# Patient Record
Sex: FEMALE | Race: BLACK OR AFRICAN AMERICAN | NOT HISPANIC OR LATINO | Employment: FULL TIME | URBAN - METROPOLITAN AREA
[De-identification: names, ages, dates, MRNs, and addresses within clinical notes are randomized per-mention and may not be internally consistent; named-entity substitution may affect disease eponyms.]

---

## 2018-03-28 ENCOUNTER — HISTORICAL (OUTPATIENT)
Dept: ADMINISTRATIVE | Facility: HOSPITAL | Age: 63
End: 2018-03-28

## 2018-03-28 LAB
ABS NEUT (OLG): ABNORMAL
ALBUMIN SERPL-MCNC: 4 GM/DL (ref 3.4–5)
ALBUMIN/GLOB SERPL: 1.43 {RATIO} (ref 1.5–2.5)
ALP SERPL-CCNC: 62 UNIT/L (ref 38–126)
ALT SERPL-CCNC: 21 UNIT/L (ref 7–52)
AST SERPL-CCNC: 16 UNIT/L (ref 15–37)
BILIRUB SERPL-MCNC: 0.4 MG/DL (ref 0.2–1)
BILIRUBIN DIRECT+TOT PNL SERPL-MCNC: 0.1 MG/DL (ref 0–0.5)
BUN SERPL-MCNC: 12 MG/DL (ref 7–18)
CALCIUM SERPL-MCNC: 8.7 MG/DL (ref 8.5–10)
CHLORIDE SERPL-SCNC: 104 MMOL/L (ref 98–107)
CHOLEST SERPL-MCNC: 176 MG/DL (ref 0–200)
CHOLEST/HDLC SERPL: 4 {RATIO}
CO2 SERPL-SCNC: 29 MMOL/L (ref 21–32)
CREAT SERPL-MCNC: 0.63 MG/DL (ref 0.6–1.3)
CREAT UR-MCNC: 200 MG/DL
CREAT/UREA NIT SERPL: 19
ERYTHROCYTE [DISTWIDTH] IN BLOOD BY AUTOMATED COUNT: 12 % (ref 11.5–17)
EST. AVERAGE GLUCOSE BLD GHB EST-MCNC: 117 MG/DL
GGT SERPL-CCNC: 17 UNIT/L (ref 5–85)
GLOBULIN SER-MCNC: 2.8 GM/DL (ref 1.2–3)
GLUCOSE SERPL-MCNC: 123 MG/DL (ref 74–106)
HBA1C MFR BLD: 5.7 % (ref 4.4–6.4)
HCT VFR BLD AUTO: 41.1 % (ref 37–47)
HDLC SERPL-MCNC: 44 MG/DL (ref 35–60)
HGB BLD-MCNC: 12.9 GM/DL (ref 12–16)
LDH SERPL-CCNC: 172 UNIT/L (ref 140–271)
LDLC SERPL CALC-MCNC: 116 MG/DL (ref 0–129)
LYMPHOCYTES # BLD AUTO: NORMAL X10(3)/MCL (ref 0.6–3.4)
LYMPHOCYTES NFR BLD AUTO: NORMAL % (ref 13–40)
MCH RBC QN AUTO: 28.4 PG (ref 27–31.2)
MCHC RBC AUTO-ENTMCNC: 31 GM/DL (ref 32–36)
MCV RBC AUTO: 90 FL (ref 80–94)
MICROALBUMIN UR-MCNC: 30 MG/L
MICROALBUMIN/CREAT RATIO PNL UR: <30 MG/GM
MONOCYTES # BLD AUTO: NORMAL X10(3)/MCL (ref 0–1.8)
MONOCYTES NFR BLD AUTO: NORMAL % (ref 0.1–24)
NEUTROPHILS NFR BLD AUTO: NORMAL % (ref 47–80)
PLATELET # BLD AUTO: 235 X10(3)/MCL (ref 130–400)
PMV BLD AUTO: 8.6 FL
POTASSIUM SERPL-SCNC: 4.2 MMOL/L (ref 3.5–5.1)
PROT SERPL-MCNC: 6.8 GM/DL (ref 6.4–8.2)
RBC # BLD AUTO: 4.55 X10(6)/MCL (ref 4.2–5.4)
SODIUM SERPL-SCNC: 138 MMOL/L (ref 136–145)
TRIGL SERPL-MCNC: 103 MG/DL (ref 30–150)
TSH SERPL-ACNC: 1.15 MIU/ML (ref 0.35–4.94)
VLDLC SERPL CALC-MCNC: 20.6 MG/DL
WBC # SPEC AUTO: 6.9 X10(3)/MCL (ref 4.5–11.5)

## 2018-06-27 ENCOUNTER — HISTORICAL (OUTPATIENT)
Dept: ADMINISTRATIVE | Facility: HOSPITAL | Age: 63
End: 2018-06-27

## 2018-06-27 ENCOUNTER — HISTORICAL (OUTPATIENT)
Dept: LAB | Facility: HOSPITAL | Age: 63
End: 2018-06-27

## 2018-06-27 LAB
ABS NEUT (OLG): 4
APTT PPP: 36.4 SECOND(S) (ref 24.8–36.9)
ERYTHROCYTE [DISTWIDTH] IN BLOOD BY AUTOMATED COUNT: 12.4 % (ref 11.5–17)
HCT VFR BLD AUTO: 39.4 % (ref 37–47)
HGB BLD-MCNC: 12.2 GM/DL (ref 12–16)
INR PPP: 1.02 (ref 0–1.27)
LYMPHOCYTES # BLD AUTO: 2 X10(3)/MCL (ref 0.6–3.4)
LYMPHOCYTES NFR BLD AUTO: 29.8 % (ref 13–40)
MCH RBC QN AUTO: 27.5 PG (ref 27–31.2)
MCHC RBC AUTO-ENTMCNC: 31 GM/DL (ref 32–36)
MCV RBC AUTO: 89 FL (ref 80–94)
MONOCYTES # BLD AUTO: 0.6 X10(3)/MCL (ref 0–1.8)
MONOCYTES NFR BLD AUTO: 9.6 % (ref 0.1–24)
NEUTROPHILS NFR BLD AUTO: 60.6 % (ref 47–80)
PLATELET # BLD AUTO: 239 X10(3)/MCL (ref 130–400)
PMV BLD AUTO: 8.9 FL
PROTHROMBIN TIME: 13.7 SECOND(S) (ref 12.2–14.7)
RBC # BLD AUTO: 4.43 X10(6)/MCL (ref 4.2–5.4)
WBC # SPEC AUTO: 6.6 X10(3)/MCL (ref 4.5–11.5)

## 2018-07-31 ENCOUNTER — HISTORICAL (OUTPATIENT)
Dept: ADMINISTRATIVE | Facility: HOSPITAL | Age: 63
End: 2018-07-31

## 2018-07-31 LAB
ALBUMIN SERPL-MCNC: 4.1 GM/DL (ref 3.4–5)
ALBUMIN/GLOB SERPL: 1.58 {RATIO} (ref 1.5–2.5)
ALP SERPL-CCNC: 59 UNIT/L (ref 38–126)
ALT SERPL-CCNC: 24 UNIT/L (ref 7–52)
AST SERPL-CCNC: 27 UNIT/L (ref 15–37)
BILIRUB SERPL-MCNC: 0.3 MG/DL (ref 0.2–1)
BILIRUBIN DIRECT+TOT PNL SERPL-MCNC: 0.1 MG/DL (ref 0–0.5)
BILIRUBIN DIRECT+TOT PNL SERPL-MCNC: 0.2 MG/DL
BUN SERPL-MCNC: 14 MG/DL (ref 7–18)
CALCIUM SERPL-MCNC: 8.8 MG/DL (ref 8.5–10)
CHLORIDE SERPL-SCNC: 106 MMOL/L (ref 98–107)
CHOLEST SERPL-MCNC: 121 MG/DL (ref 0–200)
CHOLEST/HDLC SERPL: 3 {RATIO}
CO2 SERPL-SCNC: 29 MMOL/L (ref 21–32)
CREAT SERPL-MCNC: 0.65 MG/DL (ref 0.6–1.3)
EST. AVERAGE GLUCOSE BLD GHB EST-MCNC: 111 MG/DL
GLOBULIN SER-MCNC: 2.6 GM/DL (ref 1.2–3)
GLUCOSE SERPL-MCNC: 95 MG/DL (ref 74–106)
HBA1C MFR BLD: 5.5 % (ref 4.4–6.4)
HDLC SERPL-MCNC: 40 MG/DL (ref 35–60)
LDLC SERPL CALC-MCNC: 66 MG/DL (ref 0–129)
POTASSIUM SERPL-SCNC: 3.8 MMOL/L (ref 3.5–5.1)
PROT SERPL-MCNC: 6.7 GM/DL (ref 6.4–8.2)
SODIUM SERPL-SCNC: 142 MMOL/L (ref 136–145)
TRIGL SERPL-MCNC: 89 MG/DL (ref 30–150)
VLDLC SERPL CALC-MCNC: 17.8 MG/DL

## 2019-01-21 ENCOUNTER — HISTORICAL (OUTPATIENT)
Dept: ADMINISTRATIVE | Facility: HOSPITAL | Age: 64
End: 2019-01-21

## 2019-01-21 LAB
ALBUMIN SERPL-MCNC: 4.1 GM/DL (ref 3.4–5)
ALBUMIN/GLOB SERPL: 1.41 {RATIO} (ref 1.5–2.5)
ALP SERPL-CCNC: 57 UNIT/L (ref 38–126)
ALT SERPL-CCNC: 20 UNIT/L (ref 7–52)
AST SERPL-CCNC: 17 UNIT/L (ref 15–37)
BILIRUB SERPL-MCNC: 0.4 MG/DL (ref 0.2–1)
BILIRUBIN DIRECT+TOT PNL SERPL-MCNC: 0.1 MG/DL (ref 0–0.5)
BILIRUBIN DIRECT+TOT PNL SERPL-MCNC: 0.3 MG/DL
BUN SERPL-MCNC: 14 MG/DL (ref 7–18)
CALCIUM SERPL-MCNC: 8.7 MG/DL (ref 8.5–10)
CHLORIDE SERPL-SCNC: 104 MMOL/L (ref 98–107)
CO2 SERPL-SCNC: 31 MMOL/L (ref 21–32)
CREAT SERPL-MCNC: 0.63 MG/DL (ref 0.6–1.3)
EST. AVERAGE GLUCOSE BLD GHB EST-MCNC: 108 MG/DL
GLOBULIN SER-MCNC: 2.9 GM/DL (ref 1.2–3)
GLUCOSE SERPL-MCNC: 101 MG/DL (ref 74–106)
HBA1C MFR BLD: 5.4 % (ref 4.4–6.4)
POTASSIUM SERPL-SCNC: 3.9 MMOL/L (ref 3.5–5.1)
PROT SERPL-MCNC: 7 GM/DL (ref 6.4–8.2)
SODIUM SERPL-SCNC: 140 MMOL/L (ref 136–145)

## 2019-04-15 ENCOUNTER — HISTORICAL (OUTPATIENT)
Dept: ADMINISTRATIVE | Facility: HOSPITAL | Age: 64
End: 2019-04-15

## 2019-04-15 LAB
ABS NEUT (OLG): 4.1 X10(3)/MCL (ref 2.1–9.2)
ALBUMIN SERPL-MCNC: 4.2 GM/DL (ref 3.4–5)
ALBUMIN/GLOB SERPL: 1.56 {RATIO} (ref 1.5–2.5)
ALP SERPL-CCNC: 54 UNIT/L (ref 38–126)
ALT SERPL-CCNC: 21 UNIT/L (ref 7–52)
AST SERPL-CCNC: 19 UNIT/L (ref 15–37)
BILIRUB SERPL-MCNC: 0.3 MG/DL (ref 0.2–1)
BILIRUBIN DIRECT+TOT PNL SERPL-MCNC: 0.1 MG/DL (ref 0–0.5)
BILIRUBIN DIRECT+TOT PNL SERPL-MCNC: 0.2 MG/DL
BUN SERPL-MCNC: 11 MG/DL (ref 7–18)
CALCIUM SERPL-MCNC: 8.8 MG/DL (ref 8.5–10)
CHLORIDE SERPL-SCNC: 104 MMOL/L (ref 98–107)
CHOLEST SERPL-MCNC: 142 MG/DL (ref 0–200)
CHOLEST/HDLC SERPL: 3.2 {RATIO}
CO2 SERPL-SCNC: 32 MMOL/L (ref 21–32)
CREAT SERPL-MCNC: 0.61 MG/DL (ref 0.6–1.3)
CREAT UR-MCNC: 300 MG/DL
ERYTHROCYTE [DISTWIDTH] IN BLOOD BY AUTOMATED COUNT: 12.1 % (ref 11.5–17)
EST. AVERAGE GLUCOSE BLD GHB EST-MCNC: 117 MG/DL
GLOBULIN SER-MCNC: 2.8 GM/DL (ref 1.2–3)
GLUCOSE SERPL-MCNC: 132 MG/DL (ref 74–106)
HBA1C MFR BLD: 5.7 % (ref 4.4–6.4)
HCT VFR BLD AUTO: 37.4 % (ref 37–47)
HDLC SERPL-MCNC: 45 MG/DL (ref 35–60)
HGB BLD-MCNC: 12.1 GM/DL (ref 12–16)
LDLC SERPL CALC-MCNC: 74 MG/DL (ref 0–129)
LYMPHOCYTES # BLD AUTO: 1.8 X10(3)/MCL (ref 0.6–3.4)
LYMPHOCYTES NFR BLD AUTO: 27.8 % (ref 13–40)
MCH RBC QN AUTO: 28.9 PG (ref 27–31.2)
MCHC RBC AUTO-ENTMCNC: 32 GM/DL (ref 32–36)
MCV RBC AUTO: 90 FL (ref 80–94)
MICROALBUMIN UR-MCNC: 80 MG/L
MICROALBUMIN/CREAT RATIO PNL UR: <30 MG/GM
MONOCYTES # BLD AUTO: 0.5 X10(3)/MCL (ref 0.1–1.3)
MONOCYTES NFR BLD AUTO: 8.3 % (ref 0.1–24)
NEUTROPHILS NFR BLD AUTO: 63.9 % (ref 47–80)
PLATELET # BLD AUTO: 187 X10(3)/MCL (ref 130–400)
PMV BLD AUTO: 9.4 FL (ref 9.4–12.4)
POTASSIUM SERPL-SCNC: 4.1 MMOL/L (ref 3.5–5.1)
PROT SERPL-MCNC: 6.9 GM/DL (ref 6.4–8.2)
RBC # BLD AUTO: 4.18 X10(6)/MCL (ref 4.2–5.4)
SODIUM SERPL-SCNC: 142 MMOL/L (ref 136–145)
TRIGL SERPL-MCNC: 85 MG/DL (ref 30–150)
TSH SERPL-ACNC: 1.21 MIU/ML (ref 0.35–4.94)
VLDLC SERPL CALC-MCNC: 17 MG/DL
WBC # SPEC AUTO: 6.4 X10(3)/MCL (ref 4.5–11.5)

## 2019-07-16 ENCOUNTER — HISTORICAL (OUTPATIENT)
Dept: ADMINISTRATIVE | Facility: HOSPITAL | Age: 64
End: 2019-07-16

## 2019-10-14 ENCOUNTER — HISTORICAL (OUTPATIENT)
Dept: ADMINISTRATIVE | Facility: HOSPITAL | Age: 64
End: 2019-10-14

## 2019-10-14 LAB
ALBUMIN SERPL-MCNC: 4.1 GM/DL (ref 3.4–5)
ALBUMIN/GLOB SERPL: 1.58 {RATIO} (ref 1.5–2.5)
ALP SERPL-CCNC: 56 UNIT/L (ref 38–126)
ALT SERPL-CCNC: 23 UNIT/L (ref 7–52)
AST SERPL-CCNC: 20 UNIT/L (ref 15–37)
BILIRUB SERPL-MCNC: 0.3 MG/DL (ref 0.2–1)
BILIRUBIN DIRECT+TOT PNL SERPL-MCNC: 0.1 MG/DL (ref 0–0.5)
BILIRUBIN DIRECT+TOT PNL SERPL-MCNC: 0.2 MG/DL
BUN SERPL-MCNC: 13 MG/DL (ref 7–18)
CALCIUM SERPL-MCNC: 9.1 MG/DL (ref 8.5–10)
CHLORIDE SERPL-SCNC: 105 MMOL/L (ref 98–107)
CHOLEST SERPL-MCNC: 159 MG/DL (ref 0–200)
CHOLEST/HDLC SERPL: 3.6 {RATIO}
CO2 SERPL-SCNC: 30 MMOL/L (ref 21–32)
CREAT SERPL-MCNC: 0.64 MG/DL (ref 0.6–1.3)
EST. AVERAGE GLUCOSE BLD GHB EST-MCNC: 108 MG/DL
GLOBULIN SER-MCNC: 2.6 GM/DL (ref 1.2–3)
GLUCOSE SERPL-MCNC: 108 MG/DL (ref 74–106)
HBA1C MFR BLD: 5.4 % (ref 4.4–6.4)
HDLC SERPL-MCNC: 44 MG/DL (ref 35–60)
LDLC SERPL CALC-MCNC: 94 MG/DL (ref 0–129)
POTASSIUM SERPL-SCNC: 4.3 MMOL/L (ref 3.5–5.1)
PROT SERPL-MCNC: 6.7 GM/DL (ref 6.4–8.2)
SODIUM SERPL-SCNC: 141 MMOL/L (ref 136–145)
TRIGL SERPL-MCNC: 155 MG/DL (ref 30–150)
VLDLC SERPL CALC-MCNC: 31 MG/DL

## 2020-07-15 ENCOUNTER — HISTORICAL (OUTPATIENT)
Dept: ADMINISTRATIVE | Facility: HOSPITAL | Age: 65
End: 2020-07-15

## 2020-07-15 LAB
ABS NEUT (OLG): 3.8 X10(3)/MCL (ref 2.1–9.2)
ALBUMIN SERPL-MCNC: 4.2 GM/DL (ref 3.4–5)
ALBUMIN/GLOB SERPL: 1.68 {RATIO} (ref 1.5–2.5)
ALP SERPL-CCNC: 54 UNIT/L (ref 38–126)
ALT SERPL-CCNC: 25 UNIT/L (ref 7–52)
AST SERPL-CCNC: 26 UNIT/L (ref 15–37)
BILIRUB SERPL-MCNC: 0.4 MG/DL (ref 0.2–1)
BILIRUBIN DIRECT+TOT PNL SERPL-MCNC: 0.1 MG/DL (ref 0–0.5)
BILIRUBIN DIRECT+TOT PNL SERPL-MCNC: 0.3 MG/DL
BUN SERPL-MCNC: 13 MG/DL (ref 7–18)
CALCIUM SERPL-MCNC: 9.2 MG/DL (ref 8.5–10)
CHLORIDE SERPL-SCNC: 105 MMOL/L (ref 98–107)
CHOLEST SERPL-MCNC: 140 MG/DL (ref 0–200)
CHOLEST/HDLC SERPL: 2.9 {RATIO}
CO2 SERPL-SCNC: 31 MMOL/L (ref 21–32)
CREAT SERPL-MCNC: 0.6 MG/DL (ref 0.6–1.3)
CREAT UR-MCNC: 200 MG/DL
ERYTHROCYTE [DISTWIDTH] IN BLOOD BY AUTOMATED COUNT: 12.5 % (ref 11.5–17)
EST. AVERAGE GLUCOSE BLD GHB EST-MCNC: 111 MG/DL
GLOBULIN SER-MCNC: 2.5 GM/DL (ref 1.2–3)
GLUCOSE SERPL-MCNC: 100 MG/DL (ref 74–106)
HBA1C MFR BLD: 5.5 % (ref 4.4–6.4)
HCT VFR BLD AUTO: 36.3 % (ref 37–47)
HDLC SERPL-MCNC: 48 MG/DL (ref 35–60)
HGB BLD-MCNC: 11.3 GM/DL (ref 12–16)
LDLC SERPL CALC-MCNC: 72 MG/DL (ref 0–129)
LYMPHOCYTES # BLD AUTO: 2.3 X10(3)/MCL (ref 0.6–3.4)
LYMPHOCYTES NFR BLD AUTO: 34.1 % (ref 13–40)
MCH RBC QN AUTO: 27.8 PG (ref 27–31.2)
MCHC RBC AUTO-ENTMCNC: 31 GM/DL (ref 32–36)
MCV RBC AUTO: 89 FL (ref 80–94)
MICROALBUMIN UR-MCNC: 30 MG/L
MICROALBUMIN/CREAT RATIO PNL UR: <30 MG/GM
MONOCYTES # BLD AUTO: 0.5 X10(3)/MCL (ref 0.1–1.3)
MONOCYTES NFR BLD AUTO: 7.4 % (ref 0.1–24)
NEUTROPHILS NFR BLD AUTO: 58.5 % (ref 47–80)
PLATELET # BLD AUTO: 206 X10(3)/MCL (ref 130–400)
PMV BLD AUTO: 9.2 FL (ref 9.4–12.4)
POTASSIUM SERPL-SCNC: 3.9 MMOL/L (ref 3.5–5.1)
PROT SERPL-MCNC: 6.7 GM/DL (ref 6.4–8.2)
RBC # BLD AUTO: 4.06 X10(6)/MCL (ref 4.2–5.4)
SODIUM SERPL-SCNC: 142 MMOL/L (ref 136–145)
TRIGL SERPL-MCNC: 77 MG/DL (ref 30–150)
TSH SERPL-ACNC: 1.03 MIU/ML (ref 0.35–4.94)
VLDLC SERPL CALC-MCNC: 15.4 MG/DL
WBC # SPEC AUTO: 6.6 X10(3)/MCL (ref 4.5–11.5)

## 2020-08-13 LAB — NONINV COLON CA DNA+OCC BLD SCRN STL QL: NEGATIVE

## 2020-09-02 LAB — BCS RECOMMENDATION EXT: NORMAL

## 2021-01-13 ENCOUNTER — HISTORICAL (OUTPATIENT)
Dept: ADMINISTRATIVE | Facility: HOSPITAL | Age: 66
End: 2021-01-13

## 2021-01-13 LAB
ALBUMIN SERPL-MCNC: 4.1 GM/DL (ref 3.4–5)
ALBUMIN/GLOB SERPL: 1.58 {RATIO} (ref 1.5–2.5)
ALP SERPL-CCNC: 72 UNIT/L (ref 38–126)
ALT SERPL-CCNC: 37 UNIT/L (ref 7–52)
AST SERPL-CCNC: 28 UNIT/L (ref 15–37)
BILIRUB SERPL-MCNC: 0.3 MG/DL (ref 0.2–1)
BILIRUBIN DIRECT+TOT PNL SERPL-MCNC: 0.1 MG/DL (ref 0–0.5)
BILIRUBIN DIRECT+TOT PNL SERPL-MCNC: 0.2 MG/DL
BUN SERPL-MCNC: 14 MG/DL (ref 7–18)
CALCIUM SERPL-MCNC: 9.2 MG/DL (ref 8.5–10)
CHLORIDE SERPL-SCNC: 101 MMOL/L (ref 98–107)
CO2 SERPL-SCNC: 30 MMOL/L (ref 21–32)
CREAT SERPL-MCNC: 0.67 MG/DL (ref 0.6–1.3)
EST. AVERAGE GLUCOSE BLD GHB EST-MCNC: 194 MG/DL
GLOBULIN SER-MCNC: 2.6 GM/DL (ref 1.2–3)
GLUCOSE SERPL-MCNC: 290 MG/DL (ref 74–106)
HBA1C MFR BLD: 8.4 % (ref 4.4–6.4)
POTASSIUM SERPL-SCNC: 4.4 MMOL/L (ref 3.5–5.1)
PROT SERPL-MCNC: 6.7 GM/DL (ref 6.4–8.2)
SODIUM SERPL-SCNC: 139 MMOL/L (ref 136–145)

## 2021-05-27 ENCOUNTER — HISTORICAL (OUTPATIENT)
Dept: ADMINISTRATIVE | Facility: HOSPITAL | Age: 66
End: 2021-05-27

## 2021-05-27 LAB
ALBUMIN SERPL-MCNC: 4.2 GM/DL (ref 3.4–5)
ALBUMIN/GLOB SERPL: 1.62 {RATIO} (ref 1.5–2.5)
ALP SERPL-CCNC: 54 UNIT/L (ref 38–126)
ALT SERPL-CCNC: 32 UNIT/L (ref 7–52)
AST SERPL-CCNC: 28 UNIT/L (ref 15–37)
BILIRUB SERPL-MCNC: 0.4 MG/DL (ref 0.2–1)
BILIRUBIN DIRECT+TOT PNL SERPL-MCNC: 0.1 MG/DL (ref 0–0.5)
BILIRUBIN DIRECT+TOT PNL SERPL-MCNC: 0.3 MG/DL
BUN SERPL-MCNC: 15 MG/DL (ref 7–18)
CALCIUM SERPL-MCNC: 9.2 MG/DL (ref 8.5–10)
CHLORIDE SERPL-SCNC: 103 MMOL/L (ref 98–107)
CHOLEST SERPL-MCNC: 145 MG/DL (ref 0–200)
CHOLEST/HDLC SERPL: 2.7 {RATIO}
CO2 SERPL-SCNC: 30 MMOL/L (ref 21–32)
CREAT SERPL-MCNC: 0.63 MG/DL (ref 0.6–1.3)
EST. AVERAGE GLUCOSE BLD GHB EST-MCNC: 134 MG/DL
GLOBULIN SER-MCNC: 2.6 GM/DL (ref 1.2–3)
GLUCOSE SERPL-MCNC: 152 MG/DL (ref 74–106)
HBA1C MFR BLD: 6.3 % (ref 4.4–6.4)
HDLC SERPL-MCNC: 53 MG/DL (ref 35–60)
LDLC SERPL CALC-MCNC: 83 MG/DL (ref 0–129)
POTASSIUM SERPL-SCNC: 4.2 MMOL/L (ref 3.5–5.1)
PROT SERPL-MCNC: 6.8 GM/DL (ref 6.4–8.2)
SODIUM SERPL-SCNC: 140 MMOL/L (ref 136–145)
TRIGL SERPL-MCNC: 80 MG/DL (ref 30–150)
VLDLC SERPL CALC-MCNC: 16 MG/DL

## 2021-08-10 ENCOUNTER — HISTORICAL (OUTPATIENT)
Dept: ADMINISTRATIVE | Facility: HOSPITAL | Age: 66
End: 2021-08-10

## 2021-08-10 LAB
ABS NEUT (OLG): 5 X10(3)/MCL (ref 2.1–9.2)
ALBUMIN SERPL-MCNC: 4.1 GM/DL (ref 3.4–5)
ALBUMIN/GLOB SERPL: 1.52 {RATIO} (ref 1.5–2.5)
ALP SERPL-CCNC: 63 UNIT/L (ref 38–126)
ALT SERPL-CCNC: 33 UNIT/L (ref 7–52)
AST SERPL-CCNC: 32 UNIT/L (ref 15–37)
BILIRUB SERPL-MCNC: 0.4 MG/DL (ref 0.2–1)
BILIRUBIN DIRECT+TOT PNL SERPL-MCNC: 0.1 MG/DL (ref 0–0.5)
BILIRUBIN DIRECT+TOT PNL SERPL-MCNC: 0.3 MG/DL
BUN SERPL-MCNC: 11 MG/DL (ref 7–18)
CALCIUM SERPL-MCNC: 9.5 MG/DL (ref 8.5–10)
CHLORIDE SERPL-SCNC: 102 MMOL/L (ref 98–107)
CHOLEST SERPL-MCNC: 139 MG/DL (ref 0–200)
CHOLEST/HDLC SERPL: 3.1 {RATIO}
CO2 SERPL-SCNC: 29 MMOL/L (ref 21–32)
CREAT SERPL-MCNC: 0.68 MG/DL (ref 0.6–1.3)
CREAT UR-MCNC: 100 MG/DL
ERYTHROCYTE [DISTWIDTH] IN BLOOD BY AUTOMATED COUNT: 12.8 % (ref 11.5–17)
EST. AVERAGE GLUCOSE BLD GHB EST-MCNC: 163 MG/DL
GLOBULIN SER-MCNC: 2.7 GM/DL (ref 1.2–3)
GLUCOSE SERPL-MCNC: 200 MG/DL (ref 74–106)
HBA1C MFR BLD: 7.3 % (ref 4.4–6.4)
HCT VFR BLD AUTO: 37.9 % (ref 37–47)
HDLC SERPL-MCNC: 45 MG/DL (ref 35–60)
HGB BLD-MCNC: 11.8 GM/DL (ref 12–16)
LDLC SERPL CALC-MCNC: 71 MG/DL (ref 0–129)
LYMPHOCYTES # BLD AUTO: 2 X10(3)/MCL (ref 0.6–3.4)
LYMPHOCYTES NFR BLD AUTO: 26.9 % (ref 13–40)
MAGNESIUM SERPL-MCNC: 2 MG/DL (ref 1.6–2.6)
MCH RBC QN AUTO: 27.8 PG (ref 27–31.2)
MCHC RBC AUTO-ENTMCNC: 31 GM/DL (ref 32–36)
MCV RBC AUTO: 89 FL (ref 80–94)
MICROALBUMIN UR-MCNC: 10 MG/L
MICROALBUMIN/CREAT RATIO PNL UR: <30 MG/GM
MONOCYTES # BLD AUTO: 0.4 X10(3)/MCL (ref 0.1–1.3)
MONOCYTES NFR BLD AUTO: 5.6 % (ref 0.1–24)
NEUTROPHILS NFR BLD AUTO: 67.5 % (ref 47–80)
PLATELET # BLD AUTO: 221 X10(3)/MCL (ref 130–400)
PMV BLD AUTO: 10.3 FL (ref 9.4–12.4)
POTASSIUM SERPL-SCNC: 4.2 MMOL/L (ref 3.5–5.1)
PROT SERPL-MCNC: 6.8 GM/DL (ref 6.4–8.2)
RBC # BLD AUTO: 4.24 X10(6)/MCL (ref 4.2–5.4)
SODIUM SERPL-SCNC: 139 MMOL/L (ref 136–145)
TRIGL SERPL-MCNC: 124 MG/DL (ref 30–150)
TSH SERPL-ACNC: 0.95 MIU/ML (ref 0.35–4.94)
VLDLC SERPL CALC-MCNC: 24.8 MG/DL
WBC # SPEC AUTO: 7.4 X10(3)/MCL (ref 4.5–11.5)

## 2022-03-02 ENCOUNTER — HISTORICAL (OUTPATIENT)
Dept: ADMINISTRATIVE | Facility: HOSPITAL | Age: 67
End: 2022-03-02

## 2022-04-10 ENCOUNTER — HISTORICAL (OUTPATIENT)
Dept: ADMINISTRATIVE | Facility: HOSPITAL | Age: 67
End: 2022-04-10

## 2022-04-26 VITALS
DIASTOLIC BLOOD PRESSURE: 80 MMHG | OXYGEN SATURATION: 97 % | BODY MASS INDEX: 36.55 KG/M2 | HEIGHT: 64 IN | WEIGHT: 214.06 LBS | SYSTOLIC BLOOD PRESSURE: 136 MMHG

## 2022-05-03 NOTE — HISTORICAL [facility]
This is a historical note converted from [facility]. Formatting and pictures may have been removed.  Please reference Edmund for original formatting and attached multimedia. Chief Complaint  bruise on right leg. no injury recalled  History of Present Illness  Patient?developed spontaneous?large bruise right lateral knee?on Saturday. ?Painful to the touch.? Minimal?improvement since then. ?Denies any trauma or injury.? Denies spontaneous?bleeding?or medication changes.  Review of Systems  Constitutional:?No weight loss, no fever, no fatigue, no chills, no night sweats,?no weakness  Eyes:?No blurred vision,?no redness,?no drainage,?no ocular?pain  HEENT:?No sore throat,?no ear pain, no sinus pressure, no nasal congestion, no rhinorrhea, no postnasal drip  Respiratory:?No cough, no wheezing, no sputum production, no shortness of breath  Cardiovascular:?No chest pain, no palpitations, no dyspnea on exertion,?no orthopnea  Gastrointestinal:?No nausea, no vomiting, no abdominal pain, no diarrhea,?no constipation, no melena,?no hematochezia  Genitourinary:?No dysuria, no hematuria, no frequency, no urgency, no incontinence, no discharge  Musculoskeletal:?myalgias, no arthralgias, no weakness, no joint effusion, no edema  Integumentary:?No rashes, no hives, no itching, no lesions, no jaundice  Neurologic:?No headaches, no numbness, no tingling, no weakness, no dizziness  Psychiatric:?No anxiety, no irritability, no depression,?no suicidal ideations, no?homicidal ideations,?no delusions, no hallucinations  Endocrine:?No polyuria, no polydipsia, no polyphagia  Hematology:?bruising, no lymphadenopathy,?no paleness  ?  Physical Exam  Vitals & Measurements  HR:?70(Peripheral)? BP:?122/76?  HT:?162.56?cm? HT:?162.56?cm? WT:?93.0?kg? WT:?93.0?kg? BMI:?35.19?  General:?Well developed, Well-nourished, in No acute distress, A&O x 4  Eye:?PERRLA, EOMI, Clear conjunctiva, Eyelids unremarkable  Cardiovascular:?Regular rate and rhythm, No  murmurs, No gallops, No rubs  Respiratory:?Clear to auscultation bilaterally, No wheezes, No rhonchi, No?crackles  Abdomen:? Soft, Nontender, No hepatosplenomegaly, Normal and equal bowel sounds, No masses, No rebound, No guarding  Musculoskeletal:? No tenderness, FROM, No joint abnormality, No clubbing, No cyanosis,No?edema  Neurologic:? No motor or sensory deficits, Reflexes 2+ throughout, CN II-XII grossly intact  Integumentary:?6 cm x 6 cm?ecchymosis right?lateral knee, tenderness to palpation  ?  Assessment/Plan  1.?Spontaneous bruising  ?CBC, PT?INR, PTT  Venous ultrasound right lower extremity  Ordered:  Office/Outpatient Visit Level 3 Established [id number] PC, Spontaneous bruising, [facility], 06/27/18 11:52:00 CDT  Schedule Diagnostics Study, Venous Ultrasound, No Oral Contrast Requested, Next Available Dr. Baumann, 06/27/18 11:52:00 CDT, Spontaneous bruising  ?   Problem List/Past Medical History  Ongoing  Diabetes mellitus type 2  CORINNA - Generalised anxiety disorder  GERD - Gastro-esophageal reflux disease  Hypertension  Historical  Asthma  Procedure/Surgical History  Abdominal hysterectomy, Dilation and curettage.  Medications  aspirin 81 mg oral tablet, 81 mg= 1 tab(s), Oral, BID  Crestor 5 mg oral tablet, 5 mg= 1 tab(s), Oral, Daily, 5 refills  hydrochlorothiazide-lisinopril 12.5 mg-20 mg oral tablet, 1 tab(s), Oral, Daily, 3 refills  LEVOCETIRIZI TAB 5MG, 5 mg= 1 tab(s), Oral, Daily  MELOXICAM TAB 15MG, 15 mg= 1 tab(s), Oral, Daily  one touch ultra lancets, See Instructions, 11 refills  Prenatal Multivitamins, Oral, Daily  Trulicity Pen 1.5 mg/0.5 mL subcutaneous solution, 1.5 mg, Subcutaneous, qWeek, 3 refills  Vitamin C 100 mg oral tablet, chewable, 100 mg= 1 tab(s), Chewed, Daily  Zinc, 140 mg, Oral, Daily  Allergies  Bactrim?(unknown)  codeine?(unknown)  penicillin?(unknown)  Social History  Alcohol  Current, 1-2 times per month, 03/27/2018  Employment/School  Employed,  03/27/2018  Home/Environment  Lives with Children., 03/28/2018  Tobacco  Never smoker, 03/27/2018  Family History  Dementia: Mother.  Diabetes mellitus type 2: Mother, Sister, Sister, Sister and Daughter.  Hypertension.: Mother.  Pancreatic cancer: Father.  Immunizations  Vaccine Date Status   tetanus/diphtheria/pertussis, acel(Tdap) 01/24/2013 Recorded   Health Maintenance  Health Maintenance  ???Pending?(in the next year)  ??? ??Due?  ??? ? ? ?Alcohol Misuse Screening due??06/27/18??and every 1??year(s)  ??? ? ? ?Cervical Cancer Screening due??06/27/18??and every 5??year(s)  ??? ? ? ?Colorectal Screening due??06/27/18??Variable frequency  ??? ? ? ?Depression Screening due??06/27/18??and every 1??year(s)  ??? ? ? ?Diabetes Maintenance-Eye Exam due??06/27/18??Variable frequency  ??? ? ? ?Diabetes Maintenance-Foot Exam due??06/27/18??Variable frequency  ??? ? ? ?Diabetes Maintenance-Urine Dipstick due??06/27/18??Variable frequency  ??? ? ? ?Hypertension Maintenance-Medication Prescribed due??06/27/18??and every 1??year(s)  ??? ? ? ?Hypertension Management-Education due??06/27/18??and every 1??year(s)  ??? ? ? ?Smoking Cessation due??06/27/18??and every 1??year(s)  ??? ? ? ?Zoster Vaccine due??06/27/18??and every 100??year(s)  ??? ??Due In Future?  ??? ? ? ?Diabetes Maintenance-HgbA1c not due until??09/28/18??and every 6??month(s)  ??? ? ? ?Influenza Vaccine not due until??10/02/18??and every 1??year(s)  ??? ? ? ?Hypertension Management-Blood Pressure not due until??12/27/18??and every 6??month(s)  ??? ? ? ?Aspirin Therapy for CVD Prevention not due until??03/28/19??and every 1??year(s)  ??? ? ? ?Diabetes Maintenance-Fasting Lipid Profile not due until??03/28/19??and every 1??year(s)  ??? ? ? ?Diabetes Maintenance-Microalbumin not due until??03/28/19??and every 1??year(s)  ??? ? ? ?Diabetes Maintenance-Serum Creatinine not due until??03/28/19??and every 1??year(s)  ??? ? ? ?Hypertension Management-BMP not due  until??03/28/19??and every 1??year(s)  ??? ? ? ?Lipid Screening not due until??03/28/19??and every 1??year(s)  ??? ? ? ?Breast Cancer Screening not due until??04/10/19??and every 1??year(s)  ???Satisfied?(in the past 1 year)  ??? ??Satisfied?  ??? ? ? ?Aspirin Therapy for CVD Prevention on??03/28/18.  ??? ? ? ?Blood Pressure Screening on??06/27/18.??Satisfied by Yara Gordillo  ??? ? ? ?Body Mass Index Check on??06/27/18.??Satisfied by Yara Gordillo  ??? ? ? ?Breast Cancer Screening on??04/10/18.??Satisfied by Giovanna Li  ??? ? ? ?Diabetes Maintenance-Fasting Lipid Profile on??03/28/18.??Satisfied by Sussy Matias  ??? ? ? ?Hypertension Management-Blood Pressure on??06/27/18.??Satisfied by Yara Gordillo  ??? ? ? ?Lipid Screening on??03/28/18.??Satisfied by Sussy Matias  ??? ? ? ?Obesity Screening on??06/27/18.??Satisfied by Yara Gordillo  ??? ? ? ?Tobacco Use Screening on??06/27/18.??Satisfied by Yara Gordillo  ?  ?

## 2022-05-03 NOTE — HISTORICAL [facility]
This is a historical note converted from [place]. Formatting and pictures may have been removed.  Please reference Edmund for original formatting and attached multimedia. Chief Complaint  cpx fasting  History of Present Illness  63?year old female presents for wellness visit.  Blood Pressure - 134/90  BMI - 35.78  Immunizations -?patient declines pneumonia vaccine  Breast Cancer Screening - Last screening Mammogram 2013  Cervical Cancer Screening -?total vaginal hysterectomy in 2005 for benign indications  Colon Cancer Screening - Declines colonoscopy, patient has periodically done?fecal occult blood testing in the past.? Declines at this time  Diet - Average  Exercise - Minimal  DM II - HbA1C 5.4 in January.? Trulicity decreased to 0.75mg weekly.? Tolerating well without any side effects.? Patient reports having eye exam last year at [facility].  Increased anxiety due to family issues with the care of her mother and disagreements with her sisters.? Patient feels like she is managing as best she can.  Hypercholesterolemia -at goal with Crestor 5 mg daily.? Tolerating well without any side effects.  HTN -controlled with?lisinopril HCTZ 20/12.5 mg daily. ?Tolerating well without any side effects.  Review of Systems  Constitutional:?No weight loss, no fever, no fatigue, no chills, no night sweats,?no weakness  Eyes:?No blurred vision,?no redness,?no drainage,?no ocular?pain  HEENT:?No sore throat,?no ear pain, no sinus pressure, no nasal congestion, no rhinorrhea, no postnasal drip  Respiratory:?No cough, no wheezing, no sputum production, no shortness of breath  Cardiovascular:?No chest pain, no palpitations, no dyspnea on exertion,?no orthopnea  Gastrointestinal:?No nausea, no vomiting, no abdominal pain, no diarrhea,?no constipation, no melena,?no hematochezia  Genitourinary:?No dysuria, no hematuria, no frequency, no urgency, no incontinence, no discharge  Musculoskeletal:?No myalgias, no arthralgias, no weakness, no  joint effusion, no edema  Integumentary:?No rashes, no hives, no itching, no lesions, no jaundice  Neurologic:?No headaches, no numbness, no tingling, no weakness, no dizziness  Psychiatric:?anxiety, no irritability, no depression,?no suicidal ideations, no?homicidal ideations,?no delusions, no hallucinations  Endocrine:?No polyuria, no polydipsia, no polyphagia  Hematology:?No bruising, no lymphadenopathy,?no paleness  ?  Physical Exam  Vitals & Measurements  HR:?70(Peripheral)? BP:?134/90?  HT:?162?cm? WT:?93.9?kg? BMI:?35.78?  General:?Well developed, Well-nourished, in No acute distress, A&O x 4  Eye:?PERRLA, EOMI, Clear conjunctiva, Eyelids unremarkable  Ears:?Bilateral EAC clear?and?Bilateral TM clear  Nose:? Mucosa normal, No rhinorrhea, No lesions  O/P:??No?erythema,?No tonsillar hypertrophy,?No tonsillar exudates,?No cobblestoning  Neck:?Soft, Nontender, No thyromegaly, Full range of motion, No cervical lymphadenopathy, No JVD  Cardiovascular:?Regular rate and rhythm, No murmurs, No gallops, No rubs  Respiratory:?Clear to auscultation bilaterally, No wheezes, No rhonchi, No?crackles  Abdomen:? Soft, Nontender, No hepatosplenomegaly, Normal and equal bowel sounds, No masses, No rebound, No guarding  Musculoskeletal:? No tenderness, FROM, No joint abnormality, No clubbing, No cyanosis,No?edema  Neurologic:? No motor or sensory deficits, Reflexes 2+ throughout, CN II-XII grossly intact  Integumentary:?No rashes or skin lesions  Psychiatric:?Good insight,?appropriate thought process, normal affect,?appropriate?speech,  non-suicidal, cooperative, appropriate judgment  Assessment/Plan  1.?Wellness examination  ?Discussed the?importance of maintaining a balanced diet and regular exercise  Declined colorectal cancer screening at this time. ?Patient understands risk.  Patient declines pneumococcal vaccine  Ordered:  CBC w/ Auto Diff, Routine collect, 04/15/19 8:15:00 CDT, Blood, Order for future visit, Stop date  04/15/19 8:15:00 CDT, Lab Collect, Wellness examination, 04/15/19 8:15:00 CDT  Comprehensive Metabolic Panel, Routine collect, 04/15/19 8:15:00 CDT, Blood, Order for future visit, Stop date 04/15/19 8:15:00 CDT, Lab Collect, Wellness examination, 04/15/19 8:15:00 CDT  Lab Collection Request, 04/15/19 8:15:00 CDT, [facility], 04/15/19 8:15:00 CDT  Lipid Panel, Routine collect, 04/15/19 8:15:00 CDT, Blood, Order for future visit, Stop date 04/15/19 8:15:00 CDT, Lab Collect, Wellness examination, 04/15/19 8:15:00 CDT  Preventative Health Care Est 40-64 years [id number] PC, Wellness examination, [facility], 04/15/19 8:15:00 CDT  Thyroid Stimulating Hormone, Routine collect, 04/15/19 8:15:00 CDT, Blood, Order for future visit, Stop date 04/15/19 8:15:00 CDT, Lab Collect, Wellness examination, 04/15/19 8:15:00 CDT  ?  2.?Diabetes mellitus type 2  Ordered:  Clinic Follow up, *Est. 10/15/19 3:00:00 CDT, Order for future visit, Diabetes mellitus type 2  Hypertension  Hypercholesterolemia, [facility]  Hemoglobin A1c, Routine collect, 04/15/19 8:15:00 CDT, Blood, Order for future visit, Stop date 04/15/19 8:15:00 CDT, Lab Collect, Diabetes mellitus type 2, 04/15/19 8:15:00 CDT  Microalbumin Urine, Routine collect, Urine, Order for future visit, 04/15/19 8:15:00 CDT, Stop date 04/15/19 8:15:00 CDT, Nurse collect, Diabetes mellitus type 2  ?  3.?Hypertension  ?Continue lisinopril HCTZ 20/12.5 mg daily  Ordered:  Clinic Follow up, *Est. 10/15/19 3:00:00 CDT, Order for future visit, Diabetes mellitus type 2  Hypertension  Hypercholesterolemia, [facility]  ?  4.?Hypercholesterolemia  ?CMP and FLP today  Ordered:  Clinic Follow up, *Est. 10/15/19 3:00:00 CDT, Order for future visit, Diabetes mellitus type 2  Hypertension  Hypercholesterolemia, [facility]  ?  5.?CORINNA (generalized anxiety disorder)  ?Offered support  Discussed?treatment options at length?if needed  ?   Problem List/Past Medical History  Ongoing  Diabetes  mellitus type 2  CORINNA (generalized anxiety disorder)  GERD - Gastro-esophageal reflux disease  Hypercholesterolemia  Hypertension  Historical  Asthma  Procedure/Surgical History  Total hysterectomy (01/03/2005)  Abdominal hysterectomy  Dilation and curettage   Medications  aspirin 81 mg oral tablet, 81 mg= 1 tab(s), Oral, Daily  hydrochlorothiazide-lisinopril 12.5 mg-20 mg oral tablet, See Instructions, 3 refills  LEVOCETIRIZI TAB 5MG, 5 mg= 1 tab(s), Oral, Daily  meloxicam 15 mg oral tablet, See Instructions, 3 refills  one touch ultra lancets, See Instructions, 11 refills  ONE TOUCH ULTRA TEST STRIPS, See Instructions, 5 refills  Prenatal Multivitamins, Oral, Daily  rosuvastatin 5 mg oral tablet, 5 mg= 1 tab(s), Oral, Daily, 3 refills  Trulicity Pen 0.75 mg/0.5 mL subcutaneous solution, 0.75 mg, Subcutaneous, q7day  Vitamin C 100 mg oral tablet, chewable, 100 mg= 1 tab(s), Chewed, Daily  Zinc, 140 mg, Oral, Daily  Allergies  Bactrim?(unknown)  codeine?(unknown)  penicillin?(unknown)  Social History  Alcohol  Current, 1-2 times per month, 03/27/2018  Employment/School  Employed, 03/27/2018  Home/Environment  Lives with Children., 03/28/2018  Tobacco  Never (less than 100 in lifetime), N/A, 04/15/2019  Never smoker, 03/27/2018  Family History  Dementia: Mother.  Diabetes mellitus type 2: Mother, Sister, Sister, Sister and Daughter.  Hypertension.: Mother.  Pancreatic cancer: Father.  Daughter: History is negative  Immunizations  Vaccine Date Status   tetanus/diphtheria/pertussis, acel(Tdap) 01/24/2013 Recorded   Health Maintenance  Health Maintenance  ???Pending?(in the next year)  ??? ??Due?  ??? ? ? ?Diabetes Maintenance-Microalbumin due??03/28/19??and every 1??year(s)  ??? ? ? ?ADL Screening due??04/15/19??and every 1??year(s)  ??? ? ? ?Colorectal Screening due??04/15/19??and every?  ??? ? ? ?Depression Screening due??04/15/19??and every?  ??? ? ? ?Diabetes Maintenance-Eye Exam due??04/15/19??and every?  ??? ? ?  ?Diabetes Maintenance-Urine Dipstick due??04/15/19??Variable frequency  ??? ? ? ?Zoster Vaccine due??04/15/19??and every 100??year(s)  ??? ??Due In Future?  ??? ? ? ?Diabetes Maintenance-Fasting Lipid Profile not due until??07/31/19??and every 1??year(s)  ??? ? ? ?Diabetes Maintenance-HgbA1c not due until??01/21/20??and every 1??year(s)  ??? ? ? ?Hypertension Management-BMP not due until??01/21/20??and every 1??year(s)  ??? ? ? ?Diabetes Maintenance-Serum Creatinine not due until??01/21/20??and every 1??year(s)  ??? ? ? ?Breast Cancer Screening not due until??04/09/20??and every 2??year(s)  ??? ? ? ?Blood Pressure Screening not due until??04/14/20??and every 1??year(s)  ??? ? ? ?Body Mass Index Check not due until??04/14/20??and every 1??year(s)  ??? ? ? ?Diabetes Maintenance-Foot Exam not due until??04/14/20??and every 1??year(s)  ??? ? ? ?Hypertension Management-Blood Pressure not due until??04/14/20??and every 1??year(s)  ???Satisfied?(in the past 1 year)  ??? ??Satisfied?  ??? ? ? ?Alcohol Misuse Screening on??04/15/19.??Satisfied by Guillermo Irby Jr, MD  ??? ? ? ?Aspirin Therapy for CVD Prevention on??04/15/19.??Satisfied by Guillermo Irby Jr, MD??Reason: Expectation Satisfied Elsewhere  ??? ? ? ?Blood Pressure Screening on??04/15/19.??Satisfied by Yara Gordillo LPN  ??? ? ? ?Body Mass Index Check on??04/15/19.??Satisfied by Yara Gordillo LPN  ??? ? ? ?Diabetes Maintenance-Foot Exam on??04/15/19.??Satisfied by Guillermo Irby Jr, MD  ??? ? ? ?Diabetes Screening on??01/21/19.??Satisfied by Jud Valente  ??? ? ? ?Hypertension Maintenance-Medication Prescribed on??04/15/19.??Satisfied by Guillermo Irby Jr, MD??Reason: Expectation Satisfied Elsewhere  ??? ? ? ?Hypertension Management-Education on??04/15/19.??Satisfied by Guillermo Irby Jr, MD??Reason: Expectation Satisfied Elsewhere  ??? ? ? ?Influenza Vaccine on??01/21/19.??Satisfied by Yara Gordillo LPN  ??? ? ? ?Lipid Screening  on??07/31/18.??Satisfied by Luiz Antonio  ??? ? ? ?Obesity Screening on??04/15/19.??Satisfied by Yara Gordillo LPN  ??? ??Refused?  ??? ? ? ?Colorectal Screening on??04/15/19.??Recorded by Guillermo Irby Jr, MD??Reason: Patient Refuses  ??? ??Canceled?  ??? ? ? ?Smoking Cessation on??04/15/19.?Recorded by Guillermo Irby Jr, MD?Reason: Expectation Not Necessary  ?  ?

## 2022-05-03 NOTE — HISTORICAL [facility]
This is a historical note converted from Edmund. Formatting and pictures may have been removed.  Please reference Edmund for original formatting and attached multimedia. Chief Complaint  6 mth f/u  History of Present Illness  DM II - HbA1C 5.7 April 2018  HTN -well-controlled with lisinopril HCTZ 20/12.5 mg daily.?  Low back pain intermittent currently.? Radicular symptoms have resolved.? Manageable with meloxicam?15 mg daily.  Does report intermittent?cramps in bilateral lower extremities.? Does improve with?water and salt intake.? Patient does report?poor water intake overall.  Review of Systems  Constitutional:?No weight loss, no fever, no fatigue, no chills, no night sweats,?no weakness  Eyes:?No blurred vision,?no redness,?no drainage,?no ocular?pain  HEENT:?No sore throat,?no ear pain, no sinus pressure, no nasal congestion, no rhinorrhea, no postnasal drip  Respiratory:?No cough, no wheezing, no sputum production, no shortness of breath  Cardiovascular:?No chest pain, no palpitations, no dyspnea on exertion,?no orthopnea  Gastrointestinal:?No nausea, no vomiting, no abdominal pain, no diarrhea,?no constipation, no melena,?no hematochezia  Genitourinary:?No dysuria, no hematuria, no frequency, no urgency, no incontinence, no discharge  Musculoskeletal:?myalgias, no arthralgias, no weakness, no joint effusion, no edema  Integumentary:?No rashes, no hives, no itching, no lesions, no jaundice  Neurologic:?No headaches, no numbness, no tingling, no weakness, no dizziness  Psychiatric:?No anxiety, no irritability, no depression,?no suicidal ideations, no?homicidal ideations,?no delusions, no hallucinations  Endocrine:?No polyuria, no polydipsia, no polyphagia  Hematology:?No bruising, no lymphadenopathy,?no paleness  ?  Physical Exam  Vitals & Measurements  HR:?80(Peripheral)? BP:?136/80?  HT:?162?cm? WT:?94.3?kg? BMI:?35.93?  General:?Well developed, Well-nourished, in No acute distress, A&O x 4  Eye:?PERRLA,  EOMI, Clear conjunctiva, Eyelids unremarkable  Ears:?Bilateral EAC clear?and?Bilateral TM clear  Nose:? Mucosa normal, No rhinorrhea, No lesions  O/P:??No?erythema,?No tonsillar hypertrophy,?No tonsillar exudates,?No cobblestoning  Neck:?Soft, Nontender, No thyromegaly, Full range of motion, No cervical lymphadenopathy, No JVD  Cardiovascular:?Regular rate and rhythm, No murmurs, No gallops, No rubs  Respiratory:?Clear to auscultation bilaterally, No wheezes, No rhonchi, No?crackles  Abdomen:? Soft, Nontender, No hepatosplenomegaly, Normal and equal bowel sounds, No masses, No rebound, No guarding  Musculoskeletal:? No tenderness, FROM, No joint abnormality, No clubbing, No cyanosis,No?edema  Neurologic:? No motor or sensory deficits, Reflexes 2+ throughout, CN II-XII grossly intact  Integumentary:?No rashes or skin lesions  ?  Assessment/Plan  1.?Diabetes mellitus type 2?E11.9  Ordered:  Clinic Follow up, *Est. 04/14/20 3:00:00 CDT, Wellness Physical, Order for future visit, Diabetes mellitus type 2  Hypertension  Hypercholesterolemia, [facility]  Hemoglobin A1c, Routine collect, 10/14/19 9:39:00 CDT, Blood, Stop date 10/14/19 9:39:00 CDT, Lab Collect, Diabetes mellitus type 2, 10/14/19 9:39:00 CDT  Office/Outpatient Visit Level 4 Established [id number] PC, Diabetes mellitus type 2  Hypertension  Hypercholesterolemia, [facility] - [facility], 10/14/19 9:31:00 CDT  ?  2.?Hypertension?I10  ?Continue lisinopril HCTZ 20/12.5 mg daily  Ordered:  Clinic Follow up, *Est. 04/14/20 3:00:00 CDT, Wellness Physical, Order for future visit, Diabetes mellitus type 2  Hypertension  Hypercholesterolemia, [facility]  Office/Outpatient Visit Level 4 Established [id number] PC, Diabetes mellitus type 2  Hypertension  Hypercholesterolemia, [facility], 10/14/19 9:31:00 CDT  ?  3.?Hypercholesterolemia?E78.00  Ordered:  Clinic Follow up, *Est. 04/14/20 3:00:00 CDT, Wellness Physical, Order for future visit, Diabetes mellitus type 2   Hypertension  Hypercholesterolemia, [facility]  Comprehensive Metabolic Panel, Routine collect, 10/14/19 9:39:00 CDT, Blood, Stop date 10/14/19 9:39:00 CDT, Lab Collect, Hypercholesterolemia, 10/14/19 9:39:00 CDT  Lipid Panel, Routine collect, 10/14/19 9:39:00 CDT, Blood, Stop date 10/14/19 9:39:00 CDT, Lab Collect, Hypercholesterolemia, 10/14/19 9:39:00 CDT  Office/Outpatient Visit Level 4 Established [id number] PC, Diabetes mellitus type 2  Hypertension  Hypercholesterolemia, [facility], 10/14/19 9:31:00 CDT  ?  4.?Lumbar radiculopathy, right?M54.16  ?Meloxicam 15 mg daily as needed  ?  Referrals  Clinic Follow up, *Est. 04/14/20 3:00:00 CDT, Wellness Physical, Order for future visit, Diabetes mellitus type 2  Hypertension  Hypercholesterolemia, [facility]   Problem List/Past Medical History  Ongoing  Diabetes mellitus type 2  CORINNA (generalized anxiety disorder)  GERD - Gastro-esophageal reflux disease  Hypercholesterolemia  Hypertension  Historical  Asthma  Procedure/Surgical History  Total hysterectomy (01/03/2005)  Abdominal hysterectomy  Dilation and curettage   Medications  aspirin 81 mg oral tablet, 81 mg= 1 tab(s), Oral, Daily  hydrochlorothiazide-lisinopril 12.5 mg-20 mg oral tablet, See Instructions, 3 refills  LEVOCETIRIZI TAB 5MG, 5 mg= 1 tab(s), Oral, Daily  meloxicam 15 mg oral tablet, See Instructions, 3 refills  montelukast 10 mg oral TABLET, 10 mg= 1 tab(s), Oral, Daily  one touch ultra lancets, See Instructions, 11 refills  ONE TOUCH ULTRA TEST STRIPS, See Instructions, 5 refills  Prenatal Multivitamins, Oral, Daily  rosuvastatin 5 mg oral tablet, 5 mg= 1 tab(s), Oral, Daily, 3 refills  Symbicort 160 mcg-4.5 mcg/inh inhalation aerosol, 2 puff(s), INH, BID  Trulicity Pen 0.75 mg/0.5 mL subcutaneous solution, 0.75 mg, Subcutaneous, q7day  Ventolin HFA 90 mcg/inh inhalation aerosol, 2 puff(s), INH, QID  Vitamin C 100 mg oral tablet, chewable, 100 mg= 1 tab(s), Chewed, Daily  Zinc, 140 mg, Oral,  Daily  Allergies  Bactrim?(unknown)  codeine?(unknown)  penicillin?(unknown)  Social History  Abuse/Neglect  No, 10/14/2019  Alcohol  Current, 1-2 times per month, 03/27/2018  Employment/School  Employed, 03/27/2018  Home/Environment  Lives with Children., 03/28/2018  Tobacco  Never (less than 100 in lifetime), N/A, 10/14/2019  Never (less than 100 in lifetime), N/A, 04/15/2019  Never smoker, 03/27/2018  Family History  Dementia: Mother.  Diabetes mellitus type 2: Mother, Sister, Sister, Sister and Daughter.  Hypertension.: Mother.  Pancreatic cancer: Father.  Daughter: History is negative  Immunizations  Vaccine Date Status   tetanus/diphtheria/pertussis, acel(Tdap) 01/24/2013 Recorded   Health Maintenance  Health Maintenance  ???Pending?(in the next year)  ??? ??Due?  ??? ? ? ?ADL Screening due??10/14/19??and every 1??year(s)  ??? ? ? ?Colorectal Screening due??10/14/19??and every?  ??? ? ? ?Depression Screening due??10/14/19??and every?  ??? ? ? ?Diabetes Maintenance-Urine Dipstick due??10/14/19??Variable frequency  ??? ? ? ?Diabetes Maintenance-Eye Exam due??10/14/19??and every?  ??? ? ? ?Influenza Vaccine due??10/14/19??and every?  ??? ? ? ?Zoster Vaccine due??10/14/19??and every 100??year(s)  ??? ??Due In Future?  ??? ? ? ?Alcohol Misuse Screening not due until??01/01/20??and every 1??year(s)  ??? ? ? ?Obesity Screening not due until??01/01/20??and every 1??year(s)  ??? ? ? ?Breast Cancer Screening not due until??04/09/20??and every 2??year(s)  ??? ? ? ?Diabetes Maintenance-Fasting Lipid Profile not due until??04/14/20??and every 1??year(s)  ??? ? ? ?Diabetes Maintenance-Foot Exam not due until??04/14/20??and every 1??year(s)  ??? ? ? ?Diabetes Maintenance-HgbA1c not due until??04/14/20??and every 1??year(s)  ??? ? ? ?Hypertension Management-BMP not due until??04/14/20??and every 1??year(s)  ??? ? ? ?Diabetes Maintenance-Serum Creatinine not due until??04/15/20??and every 1??year(s)  ??? ? ? ?Aspirin Therapy  for CVD Prevention not due until??04/15/20??and every 1??year(s)  ??? ? ? ?Hypertension Maintenance-Medication Prescribed not due until??04/15/20??and every 1??year(s)  ??? ? ? ?Hypertension Management-Education not due until??04/15/20??and every 1??year(s)  ??? ? ? ?Blood Pressure Screening not due until??10/13/20??and every 1??year(s)  ??? ? ? ?Body Mass Index Check not due until??10/13/20??and every 1??year(s)  ??? ? ? ?Hypertension Management-Blood Pressure not due until??10/13/20??and every 1??year(s)  ???Satisfied?(in the past 1 year)  ??? ??Satisfied?  ??? ? ? ?Alcohol Misuse Screening on??04/15/19.??Satisfied by Guillermo Irby Jr, MD  ??? ? ? ?Aspirin Therapy for CVD Prevention on??04/15/19.??Satisfied by Guillermo Irby Jr, MD??Reason: Expectation Satisfied Elsewhere  ??? ? ? ?Blood Pressure Screening on??10/14/19.??Satisfied by Yara Gordillo LPN  ??? ? ? ?Body Mass Index Check on??10/14/19.??Satisfied by Yara Gordillo LPN  ??? ? ? ?Diabetes Maintenance-Fasting Lipid Profile on??04/15/19.??Satisfied by Luiz Antonio  ??? ? ? ?Diabetes Maintenance-Foot Exam on??04/15/19.??Satisfied by Guillermo Irby Jr, MD  ??? ? ? ?Diabetes Maintenance-HgbA1c on??04/15/19.??Satisfied by Luiz Antonio  ??? ? ? ?Diabetes Maintenance-Serum Creatinine on??04/15/19.??Satisfied by Luiz Antonio  ??? ? ? ?Diabetes Screening on??04/15/19.??Satisfied by Luiz Antonio  ??? ? ? ?Hypertension Maintenance-Medication Prescribed on??04/15/19.??Satisfied by Guillermo Irby Jr, MD??Reason: Expectation Satisfied Elsewhere  ??? ? ? ?Hypertension Management-Blood Pressure on??10/14/19.??Satisfied by Yara Gordillo LPN  ??? ? ? ?Hypertension Management-BMP on??04/15/19.??Satisfied by Luiz Antonio  ??? ? ? ?Hypertension Management-Education on??04/15/19.??Satisfied by Guillermo Irby Jr, MD??Reason: Expectation Satisfied Elsewhere  ??? ? ? ?Influenza Vaccine on??10/14/19.??Satisfied by Yara Gordillo LPN  ??? ? ? ?Lipid Screening  on??04/15/19.??Satisfied by Luiz Antonio  ??? ? ? ?Obesity Screening on??10/14/19.??Satisfied by Yara Gordillo LPN  ??? ??Refused?  ??? ? ? ?Colorectal Screening on??04/15/19.??Recorded by Surekha Lott MD, Guillermo PEREZ??Reason: Patient Refuses  ?

## 2022-05-03 NOTE — HISTORICAL [facility]
This is a historical note converted from Edmund. Formatting and pictures may have been removed.  Please reference Edmund for original formatting and attached multimedia. Chief Complaint  lower back pain, headache  History of Present Illness  Patient presents with one-week history of low back pain?mostly right?sided.? Yesterday patient developed?radiating pain down right leg?and numbness down to the calf.? Worse with?prolonged sitting.? Denies any weakness, bowel or bladder incontinence.  Review of Systems  Constitutional:?No weight loss, no fever, no fatigue, no chills, no night sweats,?no weakness  Eyes:?No blurred vision,?no redness,?no drainage,?no ocular?pain  HEENT:?No sore throat,?no ear pain, no sinus pressure, no nasal congestion, no rhinorrhea, no postnasal drip  Respiratory:?No cough, no wheezing, no sputum production, no shortness of breath  Cardiovascular:?No chest pain, no palpitations, no dyspnea on exertion,?no orthopnea  Gastrointestinal:?No nausea, no vomiting, no abdominal pain, no diarrhea,?no constipation, no melena,?no hematochezia  Genitourinary:?No dysuria, no hematuria, no frequency, no urgency, no incontinence, no discharge  Musculoskeletal:?myalgias, no arthralgias, no weakness, no joint effusion, no edema  Integumentary:?No rashes, no hives, no itching, no lesions, no jaundice  Neurologic:?No headaches, no numbness, no tingling, no weakness, no dizziness  Psychiatric:?No anxiety, no irritability, no depression,?no suicidal ideations, no?homicidal ideations,?no delusions, no hallucinations  Endocrine:?No polyuria, no polydipsia, no polyphagia  Hematology:?No bruising, no lymphadenopathy,?no paleness  ?  Physical Exam  Vitals & Measurements  T:?37.1? ?C (Oral)? HR:?60(Peripheral)? BP:?142/82?  HT:?162?cm? WT:?92.3?kg? BMI:?35.17?  General:?Well developed, Well-nourished, in No acute distress, A&O x 4  Cardiovascular:?Regular rate and rhythm, No murmurs, No gallops, No  rubs  Respiratory:?Clear to auscultation bilaterally, No wheezes, No rhonchi, No?crackles  Musculoskeletal:? low lumbar paraspinal tenderness, FROM, No joint abnormality, No clubbing, No cyanosis,No?edema, negative straight leg raise  Neurologic:? No motor or sensory deficits, Reflexes 2+ throughout, CN II-XII grossly intact  Integumentary:?No rashes or skin lesions  ?  Assessment/Plan  1.?Lumbar radiculopathy, right?M54.16  ?L-spine x-ray-degenerative disc disease noted?multilevel although greatest at L1-L2  Celestone 6 mg IM today  Hold meloxicam  Naproxen 500 mg twice daily  Robaxin 750 mg 1-2 tabs 3 times daily as needed  If symptoms fail to improve or worsen patient contact office  Ordered:  betamethasone, 6 mg, IM, Once-Unscheduled, first dose 07/16/19 13:49:00 CDT  Office/Outpatient Visit Level 3 Established [id number] PC, Lumbar radiculopathy, right, [facility], 07/16/19 13:49:00 CDT  XR Spine Lumbar 2 or 3 Views, Routine, 07/16/19 13:33:00 CDT, Other (please specify), None, Ambulatory, Rad Type, Lumbar radiculopathy, right, [facility], 07/16/19 13:33:00 CDT  ?  Orders:  methocarbamol, 1-2 tab(s), Oral, TID, Use for pain and muscle spasm, may cause sedation, X 7 day(s), # 30 tab(s), 1 Refill(s), Pharmacy: [facility]/pharmacy #[id number]  naproxen, 500 mg = 1 tab(s), Oral, BID, # 30 tab(s), 0 Refill(s), Pharmacy: [facility]/pharmacy #[id number]   Problem List/Past Medical History  Ongoing  Diabetes mellitus type 2  CORINNA (generalized anxiety disorder)  GERD - Gastro-esophageal reflux disease  Hypercholesterolemia  Hypertension  Historical  Asthma  Procedure/Surgical History  Total hysterectomy (01/03/2005)  Abdominal hysterectomy  Dilation and curettage   Medications  aspirin 81 mg oral tablet, 81 mg= 1 tab(s), Oral, Daily  hydrochlorothiazide-lisinopril 12.5 mg-20 mg oral tablet, See Instructions, 3 refills  LEVOCETIRIZI TAB 5MG, 5 mg= 1 tab(s), Oral, Daily  meloxicam 15 mg oral tablet, See Instructions, 3  refills  montelukast 10 mg oral TABLET, 10 mg= 1 tab(s), Oral, Daily  naproxen 500 mg oral tablet, 500 mg= 1 tab(s), Oral, BID  one touch ultra lancets, See Instructions, 11 refills  ONE TOUCH ULTRA TEST STRIPS, See Instructions, 5 refills  Prenatal Multivitamins, Oral, Daily  Robaxin 750 mg oral tablet, 1-2 tab(s), Oral, TID, 1 refills  rosuvastatin 5 mg oral tablet, 5 mg= 1 tab(s), Oral, Daily, 3 refills  Symbicort 160 mcg-4.5 mcg/inh inhalation aerosol, 2 puff(s), INH, BID  Trulicity Pen 0.75 mg/0.5 mL subcutaneous solution, 0.75 mg, Subcutaneous, q7day  Ventolin HFA 90 mcg/inh inhalation aerosol, 2 puff(s), INH, QID  Vitamin C 100 mg oral tablet, chewable, 100 mg= 1 tab(s), Chewed, Daily  Zinc, 140 mg, Oral, Daily  Allergies  Bactrim?(unknown)  codeine?(unknown)  penicillin?(unknown)  Social History  Abuse/Neglect  No, 07/16/2019  Alcohol  Current, 1-2 times per month, 03/27/2018  Employment/School  Employed, 03/27/2018  Home/Environment  Lives with Children., 03/28/2018  Tobacco  Never (less than 100 in lifetime), N/A, 07/16/2019  Never (less than 100 in lifetime), N/A, 04/15/2019  Never smoker, 03/27/2018  Family History  Dementia: Mother.  Diabetes mellitus type 2: Mother, Sister, Sister, Sister and Daughter.  Hypertension.: Mother.  Pancreatic cancer: Father.  Daughter: History is negative  Immunizations  Vaccine Date Status   tetanus/diphtheria/pertussis, acel(Tdap) 01/24/2013 Recorded   Health Maintenance  Health Maintenance  ???Pending?(in the next year)  ??? ??Due?  ??? ? ? ?ADL Screening due??07/16/19??and every 1??year(s)  ??? ? ? ?Colorectal Screening due??07/16/19??and every?  ??? ? ? ?Depression Screening due??07/16/19??and every?  ??? ? ? ?Diabetes Maintenance-Urine Dipstick due??07/16/19??Variable frequency  ??? ? ? ?Diabetes Maintenance-Eye Exam due??07/16/19??and every?  ??? ? ? ?Zoster Vaccine due??07/16/19??and every 100??year(s)  ??? ??Due In Future?  ??? ? ? ?Alcohol Misuse Screening not due  until??01/01/20??and every 1??year(s)  ??? ? ? ?Obesity Screening not due until??01/01/20??and every 1??year(s)  ??? ? ? ?Breast Cancer Screening not due until??04/09/20??and every 2??year(s)  ??? ? ? ?Diabetes Maintenance-Fasting Lipid Profile not due until??04/14/20??and every 1??year(s)  ??? ? ? ?Diabetes Maintenance-Foot Exam not due until??04/14/20??and every 1??year(s)  ??? ? ? ?Diabetes Maintenance-HgbA1c not due until??04/14/20??and every 1??year(s)  ??? ? ? ?Hypertension Management-BMP not due until??04/14/20??and every 1??year(s)  ??? ? ? ?Diabetes Maintenance-Microalbumin not due until??04/15/20??and every 1??year(s)  ??? ? ? ?Diabetes Maintenance-Serum Creatinine not due until??04/15/20??and every 1??year(s)  ??? ? ? ?Aspirin Therapy for CVD Prevention not due until??04/15/20??and every 1??year(s)  ??? ? ? ?Hypertension Maintenance-Medication Prescribed not due until??04/15/20??and every 1??year(s)  ??? ? ? ?Hypertension Management-Education not due until??04/15/20??and every 1??year(s)  ??? ? ? ?Blood Pressure Screening not due until??07/15/20??and every 1??year(s)  ??? ? ? ?Body Mass Index Check not due until??07/15/20??and every 1??year(s)  ??? ? ? ?Hypertension Management-Blood Pressure not due until??07/15/20??and every 1??year(s)  ???Satisfied?(in the past 1 year)  ??? ??Satisfied?  ??? ? ? ?Alcohol Misuse Screening on??04/15/19.??Satisfied by Guillermo Irby Jr, MD  ??? ? ? ?Aspirin Therapy for CVD Prevention on??04/15/19.??Satisfied by Guillermo Irby Jr, MD??Reason: Expectation Satisfied Elsewhere  ??? ? ? ?Blood Pressure Screening on??07/16/19.??Satisfied by Yara Gordillo LPN  ??? ? ? ?Body Mass Index Check on??07/16/19.??Satisfied by Yara Gordillo LPN  ??? ? ? ?Diabetes Maintenance-Fasting Lipid Profile on??04/15/19.??Satisfied by Luiz Antonio  ??? ? ? ?Diabetes Maintenance-Foot Exam on??04/15/19.??Satisfied by Guillermo Irby Jr, MD  ??? ? ? ?Diabetes Maintenance-HgbA1c  on??04/15/19.??Satisfied by Luiz Antonio  ??? ? ? ?Diabetes Maintenance-Microalbumin on??04/15/19.??Satisfied by Luiz Antonio  ??? ? ? ?Diabetes Maintenance-Serum Creatinine on??04/15/19.??Satisfied by Luiz Antonio  ??? ? ? ?Diabetes Screening on??04/15/19.??Satisfied by Luiz Antonio  ??? ? ? ?Hypertension Maintenance-Medication Prescribed on??04/15/19.??Satisfied by Guillermo Irby Jr, MD??Reason: Expectation Satisfied Elsewhere  ??? ? ? ?Hypertension Management-Blood Pressure on??07/16/19.??Satisfied by Yara Gordillo LPN  ??? ? ? ?Hypertension Management-BMP on??04/15/19.??Satisfied by Luiz Antonio  ??? ? ? ?Hypertension Management-Education on??04/15/19.??Satisfied by Guillermo Irby Jr, MD??Reason: Expectation Satisfied Elsewhere  ??? ? ? ?Influenza Vaccine on??01/21/19.??Satisfied by Yara Gordillo LPN  ??? ? ? ?Lipid Screening on??04/15/19.??Satisfied by Luiz Antonio  ??? ? ? ?Obesity Screening on??07/16/19.??Satisfied by Yara Gordillo LPN  ??? ??Refused?  ??? ? ? ?Colorectal Screening on??04/15/19.??Recorded by Guillermo Irby Jr, MD??Reason: Patient Refuses  ?

## 2022-05-03 NOTE — HISTORICAL [facility]
This is a historical note converted from Edmund. Formatting and pictures may have been removed.  Please reference Edmund for original formatting and attached multimedia. Chief Complaint  6 mth f/u  History of Present Illness  Patient presents for 6-month follow-up.? Hemoglobin A1c?5.5 in July. ?Patient continues to tolerate Trulicity?well without any current side effects.  Hypertension remains controlled with lisinopril HCTZ 20/12.5 mg daily.  Patient was started on rosuvastatin 5 mg daily?in April. ?She has tolerated well without any side effects.? She has not taken in the last month due to prescription not automatically refilled by her pharmacy.  Patient reports?midthoracic back pain intermittently over the last 2 weeks. ?Also was noted?right medial?knee arthralgias.? Not relieved with meloxicam. ?Denies any direct injury or trauma.  Review of Systems  Constitutional:?No weight loss, no fever, no fatigue, no chills, no night sweats,?no weakness  Eyes:?No blurred vision,?no redness,?no drainage,?no ocular?pain  HEENT:?No sore throat,?no ear pain, no sinus pressure, no nasal congestion, no rhinorrhea, no postnasal drip  Respiratory:?No cough, no wheezing, no sputum production, no shortness of breath  Cardiovascular:?No chest pain, no palpitations, no dyspnea on exertion,?no orthopnea  Gastrointestinal:?No nausea, no vomiting, no abdominal pain, no diarrhea,?no constipation, no melena,?no hematochezia  Genitourinary:?No dysuria, no hematuria, no frequency, no urgency, no incontinence, no discharge  Musculoskeletal:?myalgias, arthralgias, no weakness, no joint effusion, no edema  Integumentary:?No rashes, no hives, no itching, no lesions, no jaundice  Neurologic:?No headaches, no numbness, no tingling, no weakness, no dizziness  Psychiatric:?No anxiety, no irritability, no depression,?no suicidal ideations, no?homicidal ideations,?no delusions, no hallucinations  Endocrine:?No polyuria, no polydipsia, no  polyphagia  Hematology:?No bruising, no lymphadenopathy,?no paleness  ?  Physical Exam  Vitals & Measurements  HR:?60(Peripheral)? BP:?132/78?  HT:?162?cm? HT:?162?cm? WT:?91.5?kg? WT:?91.5?kg? BMI:?34.87?  General:?Well developed, Well-nourished, in No acute distress, A&O x 4  Eye:?PERRLA, EOMI, Clear conjunctiva, Eyelids unremarkable  Ears:?Bilateral EAC clear?and?Bilateral TM clear  Nose:? Mucosa normal, No rhinorrhea, No lesions  O/P:??No?erythema,?No tonsillar hypertrophy,?No tonsillar exudates,?No cobblestoning  Neck:?Soft, Nontender, No thyromegaly, Full range of motion, No cervical lymphadenopathy, No JVD  Cardiovascular:?Regular rate and rhythm, No murmurs, No gallops, No rubs  Respiratory:?Clear to auscultation bilaterally, No wheezes, No rhonchi, No?crackles  Abdomen:? Soft, Nontender, No hepatosplenomegaly, Normal and equal bowel sounds, No masses, No rebound, No guarding  Musculoskeletal:?Mid thoracic paraspinal?tenderness, FROM, No joint abnormality, No clubbing, No cyanosis,No?edema  Neurologic:? No motor or sensory deficits, Reflexes 2+ throughout, CN II-XII grossly intact  Integumentary:?No rashes or skin lesions  Right?Knee -?Negative?Lachman,?Negative?Sean, No effusion,??Full?ROM, hamstring strength?5/5, quadriceps strength?5/5, No medial or lateral instability, medial joint line tenderness  Assessment/Plan  1.?Diabetes mellitus type 2?E11.9  ?CMP and hemoglobin A1c today  Ordered:  Clinic Follow up, *Est. 04/15/19 7:30:00 CDT, Order for future visit, Hypercholesterolemia, [facility]  Office/Outpatient Visit Level 4 Established [id number] PC, Diabetes mellitus type 2  Hypertension  Hypercholesterolemia  Thoracic back pain  Arthralgia of right knee, [facility], 01/21/19 10:57:00 CST  ?  2.?Hypertension?I10  ?Continue lisinopril HCTZ?20/12.5 mg daily  Ordered:  Clinic Follow up, *Est. 04/15/19 7:30:00 CDT, Order for future visit, Hypercholesterolemia, [facility]  Office/Outpatient Visit Level 4  Established [id number] PC, Diabetes mellitus type 2  Hypertension  Hypercholesterolemia  Thoracic back pain  Arthralgia of right knee, [facility], 01/21/19 10:57:00 CST  ?  3.?Hypercholesterolemia?E78.00  ?CMP today  Restart rosuvastatin 5 mg daily  Ordered:  Clinic Follow up, *Est. 04/15/19 7:30:00 CDT, Order for future visit, Hypercholesterolemia, [facility]  Office/Outpatient Visit Level 4 Established [id number] PC, Diabetes mellitus type 2  Hypertension  Hypercholesterolemia  Thoracic back pain  Arthralgia of right knee, [facility], 01/21/19 10:57:00 CST  ?  4.?Thoracic back pain?M54.6  ?Discontinue meloxicam and?start?diclofenac 75 mg twice daily?times 1-2 weeks  Heat  Massage  Range of motion exercises  If symptoms fail to improve recommend follow-up  Ordered:  Office/Outpatient Visit Level 4 Established [id number] PC, Diabetes mellitus type 2  Hypertension  Hypercholesterolemia  Thoracic back pain  Arthralgia of right knee, [facility] - [facility], 01/21/19 10:57:00 CST  ?  5.?Arthralgia of right knee?M25.561  ?Discontinue meloxicam and?start?diclofenac 75 mg twice daily?times 1-2 weeks  If symptoms fail to improve recommend follow-up  Ordered:  Office/Outpatient Visit Level 4 Established [id number] PC, Diabetes mellitus type 2  Hypertension  Hypercholesterolemia  Thoracic back pain  Arthralgia of right knee, [facility], 01/21/19 10:57:00 CST  ?  Orders:  diclofenac, 75 mg = 1 tab(s), Oral, BID, # 30 tab(s), 0 Refill(s), Pharmacy: [facility]/pharmacy #[id number]  rosuvastatin, 5 mg = 1 tab(s), Oral, Daily, # 90 tab(s), 3 Refill(s), Pharmacy: [facility]/pharmacy #[id number]   Problem List/Past Medical History  Ongoing  Diabetes mellitus type 2  GERD - Gastro-esophageal reflux disease  Hypercholesterolemia  Hypertension  Historical  Asthma  Procedure/Surgical History  Abdominal hysterectomy  Dilation and curettage   Medications  aspirin 81 mg oral tablet, 81 mg= 1 tab(s), Oral, BID  diclofenac sodium 75 mg oral delayed release tablet,  75 mg= 1 tab(s), Oral, BID  hydrochlorothiazide-lisinopril 12.5 mg-20 mg oral tablet, 1 tab(s), Oral, Daily, 3 refills  LEVOCETIRIZI TAB 5MG, 5 mg= 1 tab(s), Oral, Daily  meloxicam 15 mg oral tablet, See Instructions, 3 refills  one touch ultra lancets, See Instructions, 11 refills  ONE TOUCH ULTRA TEST STRIPS, See Instructions, 5 refills  Prenatal Multivitamins, Oral, Daily  rosuvastatin 5 mg oral tablet, 5 mg= 1 tab(s), Oral, Daily, 3 refills  Trulicity Pen 1.5 mg/0.5 mL subcutaneous solution, 1.5 mg, Subcutaneous, qWeek, 3 refills  Vitamin C 100 mg oral tablet, chewable, 100 mg= 1 tab(s), Chewed, Daily  Zinc, 140 mg, Oral, Daily  Allergies  Bactrim?(unknown)  codeine?(unknown)  penicillin?(unknown)  Social History  Alcohol  Current, 1-2 times per month, 03/27/2018  Employment/School  Employed, 03/27/2018  Home/Environment  Lives with Children., 03/28/2018  Tobacco  Never (less than 100 in lifetime), N/A, 01/21/2019  Never smoker, 03/27/2018  Family History  Dementia: Mother.  Diabetes mellitus type 2: Mother, Sister, Sister, Sister and Daughter.  Hypertension.: Mother.  Pancreatic cancer: Father.  Immunizations  Vaccine Date Status   tetanus/diphtheria/pertussis, acel(Tdap) 01/24/2013 Recorded   Health Maintenance  Health Maintenance  ???Pending?(in the next year)  ??? ??Due?  ??? ? ? ?ADL Screening due??01/21/19??and every 1??year(s)  ??? ? ? ?Alcohol Misuse Screening due??01/21/19??and every 1??year(s)  ??? ? ? ?Cervical Cancer Screening due??01/21/19??and every?  ??? ? ? ?Colorectal Screening due??01/21/19??and every?  ??? ? ? ?Depression Screening due??01/21/19??and every?  ??? ? ? ?Diabetes Maintenance-Eye Exam due??01/21/19??and every?  ??? ? ? ?Diabetes Maintenance-Urine Dipstick due??01/21/19??Variable frequency  ??? ? ? ?Hypertension Maintenance-Medication Prescribed due??01/21/19??and every 1??year(s)  ??? ? ? ?Hypertension Management-Education due??01/21/19??and every 1??year(s)  ??? ? ? ?Smoking  Cessation due??01/21/19??Variable frequency  ??? ? ? ?Zoster Vaccine due??01/21/19??and every 100??year(s)  ??? ??Due In Future?  ??? ? ? ?Aspirin Therapy for CVD Prevention not due until??03/28/19??and every 1??year(s)  ??? ? ? ?Diabetes Maintenance-Microalbumin not due until??03/28/19??and every 1??year(s)  ??? ? ? ?Diabetes Maintenance-Fasting Lipid Profile not due until??07/31/19??and every 1??year(s)  ???Satisfied?(in the past 1 year)  ??? ??Satisfied?  ??? ? ? ?Aspirin Therapy for CVD Prevention on??03/28/18.  ??? ? ? ?Blood Pressure Screening on??01/21/19.??Satisfied by Guillermo Irby Jr, MD  ??? ? ? ?Body Mass Index Check on??01/21/19.??Satisfied by Yara Gordillo LPN  ??? ? ? ?Breast Cancer Screening on??04/10/18.??Satisfied by Giovanna Li  ??? ? ? ?Diabetes Maintenance-Serum Creatinine on??01/21/19.??Satisfied by Jud Valente  ??? ? ? ?Diabetes Screening on??01/21/19.??Satisfied by Jud Valente  ??? ? ? ?Hypertension Management-Blood Pressure on??01/21/19.??Satisfied by Guillermo Irby Jr, MD  ??? ? ? ?Influenza Vaccine on??01/21/19.??Satisfied by Yara Gordillo LPN  ??? ? ? ?Lipid Screening on??07/31/18.??Satisfied by Luiz Antonio  ??? ? ? ?Obesity Screening on??01/21/19.??Satisfied by Yara Gordillo LPN  ?  ?

## 2022-05-03 NOTE — HISTORICAL [facility]
This is a historical note converted from Edmund. Formatting and pictures may have been removed.  Please reference Edmund for original formatting and attached multimedia. Chief Complaint  CPX FASTING  History of Present Illness  65?year old female presents for wellness visit.  Blood Pressure - 140/78  BMI - 34.9  Immunizations -?patient declines pneumonia and shingles vaccine  Breast Cancer Screening - Last screening Mammogram April 2018  Cervical Cancer Screening -?total vaginal hysterectomy in 2005 for benign indications  Colon Cancer Screening - Declines screening colonoscopy.? Pt open to Cologuard.?  Diet - Average  Exercise - Minimal  DM II - HbA1C 5.4 in October with?Trulicity?0.75mg weekly.? Tolerating well without any side effects.? Patient reports [facility].  Hypercholesterolemia - LDL 94?with Crestor 5 mg daily.? Tolerating well without any side effects.  HTN -controlled with?lisinopril HCTZ 20/12.5 mg daily. ?Tolerating well without any side effects.  Dr. Guardado - Asthma and Allergies manged with Symbicort, Ventolin as needed, Xyzal and Singulair.?  GERD - intermittently. Controlled with antacids as needed.?  Review of Systems  Constitutional:?No weight loss, no fever, no fatigue, no chills, no night sweats,?no weakness  Eyes:?No blurred vision,?no redness,?no drainage,?no ocular?pain  HEENT:?No sore throat,?no ear pain, no sinus pressure, no nasal congestion, no rhinorrhea, no postnasal drip  Respiratory:?No cough, no wheezing, no sputum production, no shortness of breath  Cardiovascular:?No chest pain, no palpitations, no dyspnea on exertion,?no orthopnea  Gastrointestinal:?No nausea, no vomiting, no abdominal pain, no diarrhea,?no constipation, no melena,?no hematochezia  Genitourinary:?No dysuria, no hematuria, no frequency, no urgency, no incontinence, no discharge  Musculoskeletal:?No myalgias, no arthralgias, no weakness, no joint effusion, no edema  Integumentary:?No rashes,  no hives, no itching, no lesions, no jaundice  Neurologic:?No headaches, no numbness, no tingling, no weakness, no dizziness  Psychiatric:?No anxiety, no irritability, no depression,?no suicidal ideations, no?homicidal ideations,?no delusions, no hallucinations  Endocrine:?No polyuria, no polydipsia, no polyphagia  Hematology:?No bruising, no lymphadenopathy,?no paleness  ?  Physical Exam  Vitals & Measurements  HR:?80(Peripheral)? BP:?140/78?  HT:?162?cm? WT:?91.6?kg? BMI:?34.9?  General:?Well developed, Well-nourished, in No acute distress, A&O x 4  Eye:?PERRLA, EOMI, Clear conjunctiva, Eyelids unremarkable  Ears:?Bilateral EAC clear?and?Bilateral TM clear  Nose:? Mucosa normal, No rhinorrhea, No lesions  O/P:??No?erythema,?No tonsillar hypertrophy,?No tonsillar exudates,?No cobblestoning  Neck:?Soft, Nontender, No thyromegaly, Full range of motion, No cervical lymphadenopathy, No JVD  Cardiovascular:?Regular rate and rhythm, No murmurs, No gallops, No rubs  Respiratory:?Clear to auscultation bilaterally, No wheezes, No rhonchi, No?crackles  Abdomen:? Soft, Nontender, No hepatosplenomegaly, Normal and equal bowel sounds, No masses, No rebound, No guarding  Musculoskeletal:? No tenderness, FROM, No joint abnormality, No clubbing, No cyanosis,No?edema  Neurologic:? No motor or sensory deficits, Reflexes 2+ throughout, CN II-XII grossly intact  Integumentary:?No rashes or skin lesions  ?  Assessment/Plan  1.?Wellness examination?Z00.00  ?Discussed the?importance of maintaining a balanced diet and regular exercise  Ordered:  CBC w/ Auto Diff, Routine collect, 07/15/20 9:50:00 CDT, Blood, Order for future visit, Stop date 07/15/20 9:50:00 CDT, Lab Collect, Wellness examination, 07/15/20 9:50:00 CDT  Comprehensive Metabolic Panel, Routine collect, 07/15/20 9:50:00 CDT, Blood, Order for future visit, Stop date 07/15/20 9:50:00 CDT, Lab Collect, Wellness examination, 07/15/20 9:50:00 CDT  Lab Collection Request, 07/15/20  9:50:00 CDT, [facility], 07/15/20 9:50:00 CDT, Wellness examination  Lipid Panel, Routine collect, 07/15/20 9:50:00 CDT, Blood, Order for future visit, Stop date 07/15/20 9:50:00 CDT, Lab Collect, Wellness examination, 07/15/20 9:50:00 CDT  Preventative Health Care Est 40-64 years [id number] PC, Wellness examination, [facility], 07/15/20 9:50:00 CDT  Schedule Diagnostics Study, Screening Mammogram, [facility], 07/15/20 9:50:00 CDT, Wellness examination  Thyroid Stimulating Hormone, Routine collect, 07/15/20 9:50:00 CDT, Blood, Order for future visit, Stop date 07/15/20 9:50:00 CDT, Lab Collect, Wellness examination, 07/15/20 9:50:00 CDT  ?  2.?Diabetes mellitus type 2?E11.9  ?Foot exam performed today  Ordered:  Clinic Follow up, *Est. 01/15/21 3:00:00 CST, Order for future visit, Diabetes mellitus type 2  GERD - Gastro-esophageal reflux disease  Hypercholesterolemia  Hypertension, [facility]  Hemoglobin A1c, Routine collect, 07/15/20 9:50:00 CDT, Blood, Order for future visit, Stop date 07/15/20 9:50:00 CDT, Lab Collect, Diabetes mellitus type 2, 07/15/20 9:50:00 CDT  Microalbumin Urine, Routine collect, Urine, Order for future visit, 07/15/20 9:50:00 CDT, Stop date 07/15/20 9:50:00 CDT, Nurse collect, Diabetes mellitus type 2  ?  3.?GERD - Gastro-esophageal reflux disease?K21.9  ?Continue antacids as needed  Ordered:  Clinic Follow up, *Est. 01/15/21 3:00:00 CST, Order for future visit, Diabetes mellitus type 2  GERD - Gastro-esophageal reflux disease  Hypercholesterolemia  Hypertension, [facility]  ?  4.?Hypercholesterolemia?E78.00  ?CMP and FLP today  Ordered:  Clinic Follow up, *Est. 01/15/21 3:00:00 CST, Order for future visit, Diabetes mellitus type 2  GERD - Gastro-esophageal reflux disease  Hypercholesterolemia  Hypertension, [facility]  ?  5.?Hypertension?I10  ?Controlled with lisinopril HCTZ 20/12.5 mg daily  Ordered:  Clinic Follow up, *Est. 01/15/21 3:00:00 CST, Order for future visit, Diabetes  mellitus type 2  GERD - Gastro-esophageal reflux disease  Hypercholesterolemia  Hypertension, [facility]  ?  6.?Asthma?J45.909  ?Continue follow-up with allergist  Stable with?Symbicort, Singulair, Xyzal?and Ventolin as needed  ?  7.?Seasonal allergies?J30.2  ?Continue follow-up with allergist  Stable with?Singulair and Xyzal  ?  Referrals  Clinic Follow up, *Est. 01/15/21 3:00:00 CST, Order for future visit, Diabetes mellitus type 2  GERD - Gastro-esophageal reflux disease  Hypercholesterolemia  Hypertension, [facility]   Problem List/Past Medical History  Ongoing  Asthma  Diabetes mellitus type 2  CORINNA (generalized anxiety disorder)  GERD - Gastro-esophageal reflux disease  Hypercholesterolemia  Hypertension  Seasonal allergies  Historical  No qualifying data  Procedure/Surgical History  Total hysterectomy (01/03/2005)  Abdominal hysterectomy  Dilation and curettage   Medications  aspirin 81 mg oral tablet, 81 mg= 1 tab(s), Oral, Daily  hydrochlorothiazide-lisinopril 12.5 mg-20 mg oral tablet, See Instructions  LEVOCETIRIZI TAB 5MG, 5 mg= 1 tab(s), Oral, Daily  meloxicam 15 mg oral tablet, See Instructions, 3 refills  montelukast 10 mg oral TABLET, 10 mg= 1 tab(s), Oral, Daily  one touch ultra lancets, See Instructions, 11 refills  ONE TOUCH ULTRA TEST STRIPS, See Instructions, 5 refills  Prenatal Multivitamins, Oral, Daily  rosuvastatin 5 mg oral tablet, See Instructions  Symbicort 160 mcg-4.5 mcg/inh inhalation aerosol, 2 puff(s), INH, BID  Trulicity Pen 0.75 mg/0.5 mL subcutaneous solution, See Instructions  Ventolin HFA 90 mcg/inh inhalation aerosol, 2 puff(s), INH, QID  Vitamin C 100 mg oral tablet, chewable, 100 mg= 1 tab(s), Chewed, Daily  Allergies  Bactrim?(unknown)  codeine?(unknown)  penicillin?(unknown)  Social History  Abuse/Neglect  No, 07/15/2020  Alcohol  Current, 1-2 times per month, 03/27/2018  Employment/School  Employed, 03/27/2018  Home/Environment  Lives with Children.,  03/28/2018  Tobacco  Never (less than 100 in lifetime), N/A, 07/15/2020  Never (less than 100 in lifetime), N/A, 04/15/2019  Never smoker, 03/27/2018  Family History  Dementia: Mother.  Diabetes mellitus type 2: Mother, Sister, Sister, Sister and Daughter.  Hypertension.: Mother.  Pancreatic cancer: Father.  Daughter: History is negative  Immunizations  Vaccine Date Status   tetanus/diphtheria/pertussis, acel(Tdap) 01/24/2013 Recorded   Health Maintenance  Health Maintenance  ???Pending?(in the next year)  ??? ??OverDue  ??? ? ? ?Diabetes Maintenance-Microalbumin due??and every?  ??? ? ? ?Alcohol Misuse Screening due??01/02/20??and every 1??year(s)  ??? ? ? ?Breast Cancer Screening due??04/09/20??and every 2??year(s)  ??? ? ? ?Diabetes Maintenance-Foot Exam due??04/14/20??and every 1??year(s)  ??? ? ? ?Aspirin Therapy for CVD Prevention due??04/15/20??and every 1??year(s)  ??? ? ? ?Hypertension Maintenance-Medication Prescribed due??04/15/20??and every 1??year(s)  ??? ? ? ?Hypertension Management-Education due??04/15/20??and every 1??year(s)  ??? ??Due?  ??? ? ? ?ADL Screening due??07/15/20??and every 1??year(s)  ??? ? ? ?Asthma Management-[place] Peak Flow due??07/15/20??Variable frequency  ??? ? ? ?Asthma Management-Spirometry due??07/15/20??Variable frequency  ??? ? ? ?Asthma Management-Asthma Education due??07/15/20??and every 6??month(s)  ??? ? ? ?Asthma Management-Written Action Plan due??07/15/20??and every 6??month(s)  ??? ? ? ?Colorectal Screening due??07/15/20??and every?  ??? ? ? ?Depression Screening due??07/15/20??and every?  ??? ? ? ?Diabetes Maintenance-Eye Exam due??07/15/20??and every?  ??? ? ? ?Influenza Vaccine due??07/15/20??and every?  ??? ? ? ?Zoster Vaccine due??07/15/20??and every?  ??? ??Due In Future?  ??? ? ? ?Diabetes Maintenance-HgbA1c not due until??10/13/20??and every 1??year(s)  ??? ? ? ?Hypertension Management-BMP not due until??10/13/20??and every 1??year(s)  ??? ? ? ?Diabetes  Maintenance-Fasting Lipid Profile not due until??10/14/20??and every 1??year(s)  ??? ? ? ?Diabetes Maintenance-Serum Creatinine not due until??10/14/20??and every 1??year(s)  ??? ? ? ?Obesity Screening not due until??01/01/21??and every 1??year(s)  ??? ? ? ?Blood Pressure Screening not due until??06/09/21??and every 1??year(s)  ??? ? ? ?Body Mass Index Check not due until??06/09/21??and every 1??year(s)  ??? ? ? ?Hypertension Management-Blood Pressure not due until??06/09/21??and every 1??year(s)  ???Satisfied?(in the past 1 year)  ??? ??Satisfied?  ??? ? ? ?Blood Pressure Screening on??07/15/20.??Satisfied by Yara Gordillo LPN  ??? ? ? ?Body Mass Index Check on??07/15/20.??Satisfied by Yara Gordillo LPN  ??? ? ? ?Diabetes Maintenance-Fasting Lipid Profile on??10/14/19.??Satisfied by Luiz Antonio  ??? ? ? ?Diabetes Maintenance-HgbA1c on??10/14/19.??Satisfied by Luiz Antonio  ??? ? ? ?Diabetes Maintenance-Serum Creatinine on??10/14/19.??Satisfied by Luiz Antonio  ??? ? ? ?Diabetes Screening on??10/14/19.??Satisfied by Luiz Antonio  ??? ? ? ?Hypertension Management-Blood Pressure on??07/15/20.??Satisfied by Yara Gordillo LPN  ??? ? ? ?Hypertension Management-BMP on??10/14/19.??Satisfied by Luiz Antonio  ??? ? ? ?Influenza Vaccine on??10/14/19.??Satisfied by Yara Gordillo LPN  ??? ? ? ?Lipid Screening on??10/14/19.??Satisfied by Luiz Antonio  ??? ? ? ?Obesity Screening on??07/15/20.??Satisfied by Yara Gordillo LPN  ?

## 2022-05-03 NOTE — HISTORICAL [facility]ER
This is a historical note converted from Edmund. Formatting and pictures may have been removed.  Please reference Edmund for original formatting and attached multimedia. Chief Complaint  8 MTH F/U  History of Present Illness  DM II - HbA1C 5.5 in July.? Trulicity d/c at that time.??Patient reports last eye exam 2-3 years ago.  Hypercholesterolemia - LDL?72?with Crestor 5 mg daily.? Tolerating well without any side effects.  HTN - controlled with?lisinopril HCTZ 20/12.5 mg daily. ?Tolerating well without any side effects.  Dr. Guardado - Asthma and Allergies manged with Symbicort, Ventolin as needed, Xyzal and Singulair.?  GERD - intermittently. Controlled with antacids as needed.?  Patient reports?increasing anxiety and irritability?over the last few months.? Affecting work performance and quality of life.  Review of Systems  Constitutional:?No weight loss, no fever, no fatigue, no chills, no night sweats,?no weakness  Eyes:?No blurred vision,?no redness,?no drainage,?no ocular?pain  HEENT:?No sore throat,?no ear pain, no sinus pressure, no nasal congestion, no rhinorrhea, no postnasal drip  Respiratory:?No cough, no wheezing, no sputum production, no shortness of breath  Cardiovascular:?No chest pain, no palpitations, no dyspnea on exertion,?no orthopnea  Gastrointestinal:?No nausea, no vomiting, no abdominal pain, no diarrhea,?no constipation, no melena,?no hematochezia  Genitourinary:?No dysuria, no hematuria, no frequency, no urgency, no incontinence, no discharge  Musculoskeletal:?No myalgias, no arthralgias, no weakness, no joint effusion, no edema  Integumentary:?No rashes, no hives, no itching, no lesions, no jaundice  Neurologic:?No headaches, no numbness, no tingling, no weakness, no dizziness  Psychiatric:?anxiety,?irritability, no depression,?no suicidal ideations, no?homicidal ideations,?no delusions, no hallucinations  Endocrine:?No polyuria, no polydipsia, no polyphagia  Hematology:?No bruising, no  lymphadenopathy,?no paleness  ?  Physical Exam  Vitals & Measurements  HR:?77(Peripheral)? BP:?130/78? SpO2:?98%?  HT:?162.00?cm? WT:?94.900?kg? BMI:?36.16?  Constitutional:?No weight loss, no fever, no fatigue, no chills, no night sweats,?no weakness  Eyes:?No blurred vision,?no redness,?no drainage,?no ocular?pain  HEENT:?No sore throat,?no ear pain, no sinus pressure, no nasal congestion, no rhinorrhea, no postnasal drip  Respiratory:?No cough, no wheezing, no sputum production, no shortness of breath  Cardiovascular:?No chest pain, no palpitations, no dyspnea on exertion,?no orthopnea  Gastrointestinal:?No nausea, no vomiting, no abdominal pain, no diarrhea,?no constipation, no melena,?no hematochezia  Genitourinary:?No dysuria, no hematuria, no frequency, no urgency, no incontinence, no discharge  Musculoskeletal:?No myalgias, no arthralgias, no weakness, no joint effusion, no edema  Integumentary:?No rashes, no hives, no itching, no lesions, no jaundice  Neurologic:?No headaches, no numbness, no tingling, no weakness, no dizziness  Psychiatric:?anxiety, irritability, no depression,?no suicidal ideations, no?homicidal ideations,?no delusions, no hallucinations  Endocrine:?No polyuria, no polydipsia, no polyphagia  Hematology:?No bruising, no lymphadenopathy,?no paleness  ?  Assessment/Plan  1.?Diabetes mellitus type 2?E11.9  ?Encouraged patient to schedule her?diabetic eye exam  Ordered:  Clinic Follow up, *Est. 02/13/21 3:00:00 CST, Order for future visit, Diabetes mellitus type 2  Asthma  GERD - Gastro-esophageal reflux disease  Hypercholesterolemia  Hypertension  CORINNA (generalized anxiety disorder), [facility]  Comprehensive Metabolic Panel, Routine collect, 01/13/21 9:25:00 CST, Blood, Order for future visit, Stop date 01/13/21 9:25:00 CST, Lab Collect, Diabetes mellitus type 2, 01/13/21 9:25:00 CST  Hemoglobin A1c, Routine collect, 01/13/21 9:25:00 CST, Blood, Order for future visit, Stop date 01/13/21  9:25:00 CST, Lab Collect, Diabetes mellitus type 2, 01/13/21 9:25:00 CST  Lab Collection Request, 01/13/21 9:25:00 CST, [facility], 01/13/21 9:25:00 CST, Diabetes mellitus type 2  Office/Outpatient Visit Level 4 Established [id number] PC, Diabetes mellitus type 2  CORINNA (generalized anxiety disorder)  Asthma  GERD - Gastro-esophageal reflux disease  Hypercholesterolemia  Hypertension, [facility], 01/13/21 9:25:00 CST  ?  2.?CORINNA (generalized anxiety disorder)?F41.1  ?Coping skills, stress management and treatment options discussed at length  Start Zoloft 50 mg daily  Ordered:  Clinic Follow up, *Est. 02/13/21 3:00:00 CST, Order for future visit, Diabetes mellitus type 2  Asthma  GERD - Gastro-esophageal reflux disease  Hypercholesterolemia  Hypertension  CORINNA (generalized anxiety disorder), [facility]  Office/Outpatient Visit Level 4 Established [id number] PC, Diabetes mellitus type 2  COIRNNA (generalized anxiety disorder)  Asthma  GERD - Gastro-esophageal reflux disease  Hypercholesterolemia  Hypertension, [facility], 01/13/21 9:25:00 CST  ?  3.?Asthma?J45.909  ?Continue follow-up with?allergy/asthma specialist  Controlled with Symbicort 2 puffs twice daily, Singulair 10 mg daily?and Ventolin as needed  Ordered:  Clinic Follow up, *Est. 02/13/21 3:00:00 CST, Order for future visit, Diabetes mellitus type 2  Asthma  GERD - Gastro-esophageal reflux disease  Hypercholesterolemia  Hypertension  CORINNA (generalized anxiety disorder), [facility]  Office/Outpatient Visit Level 4 Established [id number] , Diabetes mellitus type 2  CORINNA (generalized anxiety disorder)  Asthma  GERD - Gastro-esophageal reflux disease  Hypercholesterolemia  Hypertension, [facility], 01/13/21 9:25:00 CST  ?  4.?GERD - Gastro-esophageal reflux disease?K21.9  ?Continue antacids as needed  Ordered:  Clinic Follow up, *Est. 02/13/21 3:00:00 CST, Order for future visit, Diabetes mellitus type 2  Asthma  GERD - Gastro-esophageal  reflux disease  Hypercholesterolemia  Hypertension  CORINNA (generalized anxiety disorder), [facility]  Office/Outpatient Visit Level 4 Established [id number] PC, Diabetes mellitus type 2  CORINNA (generalized anxiety disorder)  Asthma  GERD - Gastro-esophageal reflux disease  Hypercholesterolemia  Hypertension, [facility], 01/13/21 9:25:00 CST  ?  5.?Hypercholesterolemia?E78.00  ?Continue rosuvastatin 5 mg daily  Ordered:  Clinic Follow up, *Est. 02/13/21 3:00:00 CST, Order for future visit, Diabetes mellitus type 2  Asthma  GERD - Gastro-esophageal reflux disease  Hypercholesterolemia  Hypertension  CORINNA (generalized anxiety disorder), [facility]  Office/Outpatient Visit Level 4 Established [id number] PC, Diabetes mellitus type 2  CORINNA (generalized anxiety disorder)  Asthma  GERD - Gastro-esophageal reflux disease  Hypercholesterolemia  Hypertension, [facility], 01/13/21 9:25:00 CST  ?  6.?Hypertension?I10  ?Well-controlled with lisinopril HCTZ?20/12.5 mg daily  Ordered:  Clinic Follow up, *Est. 02/13/21 3:00:00 CST, Order for future visit, Diabetes mellitus type 2  Asthma  GERD - Gastro-esophageal reflux disease  Hypercholesterolemia  Hypertension  CORINNA (generalized anxiety disorder), [facility]  Office/Outpatient Visit Level 4 Established 25147 PC, Diabetes mellitus type 2  CORINNA (generalized anxiety disorder)  Asthma  GERD - Gastro-esophageal reflux disease  Hypercholesterolemia  Hypertension, [facility], 01/13/21 9:25:00 CST  ?  Orders:  sertraline, 50 mg = 1 tab(s), Oral, Daily, # 30 tab(s), 2 Refill(s), Pharmacy: [facility]/pharmacy #[id number], 162, cm, Height/Length Dosing, 01/13/21 8:58:00 CST, 94.9, kg, Weight Dosing, 01/13/21 8:58:00 CST  Referrals  Clinic Follow up, *Est. 02/13/21 3:00:00 CST, Order for future visit, Diabetes mellitus type 2  Asthma  GERD - Gastro-esophageal reflux disease  Hypercholesterolemia  Hypertension  CORINNA (generalized anxiety disorder), [facility]   Problem List/Past  Medical History  Ongoing  Asthma  Diabetes mellitus type 2  CORINNA (generalized anxiety disorder)  GERD - Gastro-esophageal reflux disease  Hypercholesterolemia  Hypertension  Seasonal allergies  Historical  No qualifying data  Procedure/Surgical History  Total hysterectomy (01/03/2005)  Abdominal hysterectomy  Dilation and curettage   Medications  aspirin 81 mg oral tablet, 81 mg= 1 tab(s), Oral, Daily  B 50 Complex  Centrum Adults oral tablet, Oral, Daily  hydrochlorothiazide-lisinopril 12.5 mg-20 mg oral tablet, See Instructions  LEVOCETIRIZI TAB 5MG, 5 mg= 1 tab(s), Oral, Daily  meloxicam 15 mg oral tablet, See Instructions  montelukast 10 mg oral TABLET, 10 mg= 1 tab(s), Oral, Daily  one touch ultra lancets, See Instructions, 11 refills  ONE TOUCH ULTRA TEST STRIPS, See Instructions, 5 refills  rosuvastatin 5 mg oral tablet, See Instructions  Symbicort 160 mcg-4.5 mcg/inh inhalation aerosol, 2 puff(s), INH, BID  Ventolin HFA 90 mcg/inh inhalation aerosol, 2 puff(s), INH, QID  Vitamin C 100 mg oral tablet, chewable, 100 mg= 1 tab(s), Chewed, Daily  Zoloft 50 mg oral tablet, 50 mg= 1 tab(s), Oral, Daily, 2 refills  Allergies  Bactrim?(unknown)  codeine?(unknown)  penicillin?(unknown)  Social History  Abuse/Neglect  No, 01/13/2021  Alcohol  Current, 1-2 times per month, 03/27/2018  Employment/School  Employed, 03/27/2018  Home/Environment  Lives with Children., 03/28/2018  Tobacco  Never (less than 100 in lifetime), N/A, 01/13/2021  Never (less than 100 in lifetime), N/A, 04/15/2019  Never smoker, 03/27/2018  Family History  Dementia: Mother.  Diabetes mellitus type 2: Mother, Sister, Sister, Sister and Daughter.  Hypertension.: Mother.  Pancreatic cancer: Father.  Daughter: History is negative  Immunizations  Vaccine Date Status   tetanus/diphtheria/pertussis, acel(Tdap) 01/24/2013 Recorded   Health Maintenance  Health Maintenance  ???Pending?(in the next year)  ??? ??OverDue  ??? ? ? ?Diabetes  Maintenance-Medication Prescribed due??04/15/20??and every 1??year(s)  ??? ? ? ?Hypertension Maintenance-Medication Prescribed due??04/15/20??and every 1??year(s)  ??? ? ? ?Hypertension Management-Education due??04/15/20??and every 1??year(s)  ??? ? ? ?Influenza Vaccine due??10/01/20??and every 1??day(s)  ??? ??Due?  ??? ? ? ?Alcohol Misuse Screening due??01/02/21??and every 1??year(s)  ??? ? ? ?ADL Screening due??01/13/21??and every 1??year(s)  ??? ? ? ?Asthma Management-[place] Flow due??01/13/21??Variable frequency  ??? ? ? ?Asthma Management-Spirometry due??01/13/21??Variable frequency  ??? ? ? ?Asthma Management-Asthma Education due??01/13/21??and every 6??month(s)  ??? ? ? ?Asthma Management-Written Action Plan due??01/13/21??and every 6??month(s)  ??? ? ? ?Depression Screening due??01/13/21??Unknown Frequency  ??? ? ? ?Diabetes Maintenance-Eye Exam due??01/13/21??Unknown Frequency  ??? ? ? ?Zoster Vaccine due??01/13/21??Unknown Frequency  ??? ??Due In Future?  ??? ? ? ?Diabetes Maintenance-Foot Exam not due until??07/15/21??and every 1??year(s)  ??? ? ? ?Diabetes Maintenance-HgbA1c not due until??07/15/21??and every 1??year(s)  ??? ? ? ?Hypertension Management-BMP not due until??07/15/21??and every 1??year(s)  ??? ? ? ?Diabetes Maintenance-Fasting Lipid Profile not due until??07/15/21??and every 1??year(s)  ??? ? ? ?Diabetes Maintenance-Serum Creatinine not due until??07/15/21??and every 1??year(s)  ??? ? ? ?Aspirin Therapy for CVD Prevention not due until??07/15/21??and every 1??year(s)  ??? ? ? ?Obesity Screening not due until??01/01/22??and every 1??year(s)  ???Satisfied?(in the past 1 year)  ??? ??Satisfied?  ??? ? ? ?Aspirin Therapy for CVD Prevention on??07/15/20.??Satisfied by Surekha Lott MD, Richard A??Reason: Expectation Satisfied Elsewhere  ??? ? ? ?Blood Pressure Screening on??01/13/21.??Satisfied by Surekha Lott MD, Richard A  ??? ? ? ?Body Mass Index Check on??01/13/21.??Satisfied by Duy NAPOLES,  Yara  ??? ? ? ?Breast Cancer Screening on??09/02/20.??Satisfied by Irasema De La Torre  ??? ? ? ?Diabetes Maintenance-Fasting Lipid Profile on??07/15/20.??Satisfied by Luiz Antonio  ??? ? ? ?Diabetes Maintenance-Foot Exam on??07/15/20.??Satisfied by Surekha Lott MD, Richard A  ??? ? ? ?Diabetes Maintenance-HgbA1c on??07/15/20.??Satisfied by Luiz Antonio  ??? ? ? ?Diabetes Maintenance-Microalbumin on??07/15/20.??Satisfied by Luiz Antonio  ??? ? ? ?Diabetes Maintenance-Serum Creatinine on??07/15/20.??Satisfied by Luiz Antonio  ??? ? ? ?Diabetes Screening on??07/15/20.??Satisfied by Luiz Antonio  ??? ? ? ?Hypertension Management-BMP on??07/15/20.??Satisfied by Luiz Antonio  ??? ? ? ?Hypertension Management-Blood Pressure on??01/13/21.??Satisfied by Surekha Lott MD, Richard A  ??? ? ? ?Influenza Vaccine on??01/13/21.??Satisfied by Yara Gordillo LPN  ??? ? ? ?Lipid Screening on??07/15/20.??Satisfied by Luiz Antonio  ??? ? ? ?Obesity Screening on??01/13/21.??Satisfied by Yara Gordillo LPN  ??? ??Refused?  ??? ? ? ?Zoster Vaccine on??07/15/20.??Recorded by Surekha Lott MD, Richard A??Reason: Patient Refuses  ?

## 2022-05-03 NOTE — HISTORICAL [facility]ER
This is a historical note converted from Edmund. Formatting and pictures may have been removed.  Please reference Edmund for original formatting and attached multimedia. Chief Complaint  wellness cpx fasting  History of Present Illness  62 year old female presents for wellness visit.  Blood Pressure?116/84  BMI?35  Immunizations?Tdap 2013, patient declines pneumonia and flu vaccines. ?Understands risk?of this decision  Breast Cancer Screening?mammogram overdue?last one through 4 years ago?patient given order at last visit although has not scheduled  Colon Cancer Screening?declines colonoscopy, patient is periodically done?fecal occult blood testing in the past?and?willing to do this at this time.  Cervical Cancer Screening?patient had total vaginal hysterectomy in 2005 for benign indications  Diet average  Exercise?minimal  Diabetes-A1c 5.2 September 21, 2017, patient reports poor diet compliance over last 6 months. ?CBGs 140s-150s fasting recently. ?Tolerating Trulicity well without any side effects  Hypertension-well controlled with lisinopril/HCTZ.? Denies any side effects  Patient reports?overwhelming anxiety due to?family issues with the care of her mother who has dementia. ?Difficulty sleeping at times.? Constant conflict with her sisters.  Review of Systems  Constitutional:?No weight loss, no fever,? fatigue, no chills, no night sweats,?no weakness  Eyes:?No blurred vision,?no redness,?no drainage,?no ocular?pain  HEENT:?No sore throat,?no ear pain, no sinus pressure, no nasal congestion, no rhinorrhea, no postnasal drip  Respiratory:?No cough, no wheezing, no sputum production, no shortness of breath  Cardiovascular:?No chest pain, no palpitations, no dyspnea on exertion,?no orthopnea  Gastrointestinal:?No nausea, no vomiting, no abdominal pain, no diarrhea,?no constipation, no melena,?no hematochezia  Genitourinary:?No dysuria, no hematuria, no frequency, no urgency, no incontinence, no  discharge  Musculoskeletal:?No myalgias, no arthralgias, no weakness, no joint effusion, no edema  Integumentary:?No rashes, no hives, no itching, no lesions, no jaundice  Neurologic:?No headaches, no numbness, no tingling, no weakness, no dizziness  Psychiatric:? anxiety,? irritability, no depression,?no suicidal ideations, no?homicidal ideations,?no delusions, no hallucinations  Endocrine:?No polyuria, no polydipsia, no polyphagia  Hematology:?No bruising, no lymphadenopathy,?no paleness  ?  Physical Exam  Vitals & Measurements  HR:?80(Peripheral)? BP:?116/84?  HT:?162.56?cm? HT:?162.56?cm? WT:?92.6?kg? WT:?92.6?kg? BMI:?35.04?  General:?Well developed, Well-nourished, in No acute distress, A&O x 4  Eye:?PERRLA, EOMI, Clear conjunctiva, Eyelids unremarkable  Ears:?Bilateral EAC clear?and?Bilateral TM clear  Nose:? Mucosa normal, No rhinorrhea, No lesions  O/P:??No?erythema,?No tonsillar hypertrophy,?No tonsillar exudates,?No cobblestoning  Neck:?Soft, Nontender, No thyromegaly, Full range of motion, No cervical lymphadenopathy, No JVD  Cardiovascular:?Regular rate and rhythm, No murmurs, No gallops, No rubs  Respiratory:?Clear to auscultation bilaterally, No wheezes, No rhonchi, No?crackles  Abdomen:? Soft, Nontender, No hepatosplenomegaly, Normal and equal bowel sounds, No masses, No rebound, No guarding  Musculoskeletal:? No tenderness, FROM, No joint abnormality, No clubbing, No cyanosis,No?edema  Neurologic:? No motor or sensory deficits, Reflexes 2+ throughout, CN II-XII grossly intact  Integumentary:?No rashes or skin lesions  Psychiatric:?Good insight,?appropriate thought process and affect,?appropriate?speech,  non-suicidal, cooperative, appropriate judgment, tearful  ?  Assessment/Plan  1.?Wellness examination  Ordered:  Automated Diff, Routine collect, 03/28/18 8:40:00 CDT, Blood, Collected, Stop date 03/28/18 8:40:00 CDT, Lab Collect, Wellness examination, 03/28/18 8:40:00 CDT  CBC w/ Auto Diff, Routine  collect, 03/28/18 8:40:00 CDT, Blood, Stop date 03/28/18 8:40:00 CDT, Lab Collect, Wellness examination, 03/28/18 8:40:00 CDT  CMP, Routine collect, 03/28/18 8:40:00 CDT, Blood, Stop date 03/28/18 8:40:00 CDT, Lab Collect, Wellness examination, 03/28/18 8:40:00 CDT  Lipid Panel, Routine collect, 03/28/18 8:40:00 CDT, Blood, Stop date 03/28/18 8:40:00 CDT, Lab Collect, Wellness examination, 03/28/18 8:40:00 CDT  Occult Blood Stool #2 Screening, Routine collect, 03/28/18 8:41:00 CDT, Stool, Order for future visit, Stop date 03/28/18 8:41:00 CDT, Nurse collect, Wellness examination, 03/28/18 8:41:00 CDT  Occult Blood Stool #3 Screening, Routine collect, 03/28/18 8:41:00 CDT, Stool, Order for future visit, Stop date 03/28/18 8:41:00 CDT, Nurse collect, Wellness examination, 03/28/18 8:41:00 CDT  Occult Blood Stool Screening Test, Routine collect, 03/28/18 8:41:00 CDT, Stool, Order for future visit, Stop date 03/28/18 8:41:00 CDT, Nurse collect, Wellness examination, 03/28/18 8:41:00 CDT  Preventative Health Care Est 40-64 years [id number] PC, Wellness examination, [facility], 03/28/18 8:37:00 CDT  Schedule Diagnostics Study, Screening mammogram, Breast center [facility], 03/28/18 8:41:00 CDT  Thyroid Stimulating Hormone, Routine collect, 03/28/18 8:40:00 CDT, Blood, Stop date 03/28/18 8:40:00 CDT, Lab Collect, Wellness examination, 03/28/18 8:40:00 CDT  ?  2.?Diabetes mellitus type 2  Ordered:  Clinic Follow up, *Est. 09/28/18 3:00:00 CDT, Order for future visit, CORINNA - Generalised anxiety disorder  Hypertension  Diabetes mellitus type 2, [facility]  Hemoglobin A1c, Routine collect, 03/28/18 8:40:00 CDT, Blood, Stop date 03/28/18 8:40:00 CDT, Lab Collect, Diabetes mellitus type 2, 03/28/18 8:40:00 CDT  Microalbumin Urine, Routine collect, Urine, 03/28/18 8:40:00 CDT, Stop date 03/28/18 8:40:00 CDT, Nurse collect, Diabetes mellitus type 2  Office/Outpatient Visit Level 3 Established [id number] PC, Diabetes mellitus type 2   Hypertension  CORINNA - Generalised anxiety disorder, [facility], 03/28/18 8:37:00 CDT  ?  3.?Hypertension  ?Well-controlled with lisinopril/HCTZ 20/12.5 mg daily  Ordered:  Clinic Follow up, *Est. 09/28/18 3:00:00 CDT, Order for future visit, CORINNA - Generalised anxiety disorder  Hypertension  Diabetes mellitus type 2, [facility]  Office/Outpatient Visit Level 3 Established [id number] , Diabetes mellitus type 2  Hypertension  CORINNA - Generalised anxiety disorder, [facility], 03/28/18 8:37:00 CDT  ?  4.?CORINNA - Generalised anxiety disorder  ?Discuss coping skills and stress management at length  Recommend patient establish with a counselor  Discuss treatment with SSRI the patient declines at this time  If symptoms worsen?patient to contact my office  Ordered:  Clinic Follow up, *Est. 09/28/18 3:00:00 CDT, Order for future visit, CORINNA - Generalised anxiety disorder  Hypertension  Diabetes mellitus type 2, [facility]  Office/Outpatient Visit Level 3 Established [id number] , Diabetes mellitus type 2  Hypertension  CORINNA - Generalised anxiety disorder, [facility], 03/28/18 8:37:00 CDT  ?  Orders:  dulaglutide, 1.5 mg, Subcutaneous, qWeek, # 12 units, 3 Refill(s), Pharmacy: [facility]/pharmacy #[id number]  hydrochlorothiazide-lisinopril, 1 tab(s), Oral, Daily, # 90 tab(s), 3 Refill(s), Pharmacy: [facility]/pharmacy #[id number]   Problem List/Past Medical History  Ongoing  Diabetes mellitus type 2  CORINNA - Generalised anxiety disorder  GERD - Gastro-esophageal reflux disease  Hypertension  Historical  Asthma  Procedure/Surgical History  Abdominal hysterectomy, Dilation and curettage.  Medications  aspirin 81 mg oral tablet, 81 mg= 1 tab(s), Oral, BID  hydrochlorothiazide-lisinopril 12.5 mg-20 mg oral tablet, 1 tab(s), Oral, Daily, 3 refills  LEVOCETIRIZI TAB 5MG, 5 mg= 1 tab(s), Oral, Daily  MELOXICAM TAB 15MG, 15 mg= 1 tab(s), Oral, Daily  Prenatal Multivitamins, Oral, Daily  Trulicity Pen 1.5 mg/0.5 mL subcutaneous solution, 1.5 mg,  Subcutaneous, qWeek, 3 refills  Vitamin C 100 mg oral tablet, chewable, 100 mg= 1 tab(s), Chewed, Daily  Zinc, 140 mg, Oral, Daily  Allergies  Bactrim?(unknown)  codeine?(unknown)  penicillin?(unknown)  Social History  Alcohol  Current, 1-2 times per month, 03/27/2018  Employment/School  Employed, 03/27/2018  Home/Environment  Lives with Children., 03/28/2018  Tobacco  Never smoker, 03/27/2018  Family History  Dementia: Mother.  Diabetes mellitus type 2: Mother, Sister, Sister, Sister and Daughter.  Hypertension.: Mother.  Pancreatic cancer: Father.  Immunizations  Vaccine Date Status   tetanus/diphtheria/pertussis, acel(Tdap) 01/24/2013 Recorded

## 2022-05-03 NOTE — HISTORICAL [facility] CERNER
This is a historical note converted from Edmund. Formatting and pictures may have been removed.  Please reference Edmund for original formatting and attached multimedia. Chief Complaint  4MO F[facility]  History of Present Illness  HTN well controlled with lisinopril/hctz 20/12.5mg daily.??Tolerating well without any side effects.  DMII - Hba1c 5.7 in march.? Tolerating Trulicity?well without any current side effects.  Hyperchol-started on Crestor 5 mg daily?April 3, 2018. ?LDL?116.? Tolerating well without any side effects.  Bruising right leg resolved without any?complications or concerns.  Review of Systems  Constitutional:?No weight loss, no fever, no fatigue, no chills, no night sweats,?no weakness  Eyes:?No blurred vision,?no redness,?no drainage,?no ocular?pain  HEENT:?No sore throat,?no ear pain, no sinus pressure, no nasal congestion, no rhinorrhea, no postnasal drip  Respiratory:?No cough, no wheezing, no sputum production, no shortness of breath  Cardiovascular:?No chest pain, no palpitations, no dyspnea on exertion,?no orthopnea  Gastrointestinal:?No nausea, no vomiting, no abdominal pain, no diarrhea,?no constipation, no melena,?no hematochezia  Genitourinary:?No dysuria, no hematuria, no frequency, no urgency, no incontinence, no discharge  Musculoskeletal:?No myalgias, no arthralgias, no weakness, no joint effusion, no edema  Integumentary:?No rashes, no hives, no itching, no lesions, no jaundice  Neurologic:?No headaches, no numbness, no tingling, no weakness, no dizziness  Psychiatric:?No anxiety, no irritability, no depression,?no suicidal ideations, no?homicidal ideations,?no delusions, no hallucinations  Endocrine:?No polyuria, no polydipsia, no polyphagia  Hematology:?No bruising, no lymphadenopathy,?no paleness  ?  Physical Exam  Vitals & Measurements  HR:?76(Peripheral)? BP:?124/72?  HT:?161?cm? HT:?161?cm? WT:?92?kg? WT:?92?kg?  General:?Well developed, Well-nourished, in No acute distress, A&O x  4  Eye:?PERRLA, EOMI, Clear conjunctiva, Eyelids unremarkable  Ears:?Bilateral EAC clear?and?Bilateral TM clear  Nose:? Mucosa normal, No rhinorrhea, No lesions  O/P:??No?erythema,?No tonsillar hypertrophy,?No tonsillar exudates,?No cobblestoning  Neck:?Soft, Nontender, No thyromegaly, Full range of motion, No cervical lymphadenopathy, No JVD  Cardiovascular:?Regular rate and rhythm, No murmurs, No gallops, No rubs  Respiratory:?Clear to auscultation bilaterally, No wheezes, No rhonchi, No?crackles  Abdomen:? Soft, Nontender, No hepatosplenomegaly, Normal and equal bowel sounds, No masses, No rebound, No guarding  Musculoskeletal:? No tenderness, FROM, No joint abnormality, No clubbing, No cyanosis,No?edema  Neurologic:? No motor or sensory deficits, Reflexes 2+ throughout, CN II-XII grossly intact  Integumentary:?No rashes or skin lesions  ?  Assessment/Plan  1.?Diabetes mellitus type 2  ?Hemoglobin A1c and CMP today  Ordered:  Office/Outpatient Visit Level 4 Established [id number] PC, Diabetes mellitus type 2  Hypertension  Hypercholesterolemia, [facility], 07/31/18 9:05:00 CDT  ?  2.?Hypertension  ?Continue lisinopril HCTZ?20/12.5 mg daily  Ordered:  Office/Outpatient Visit Level 4 Established [id number] PC, Diabetes mellitus type 2  Hypertension  Hypercholesterolemia, [facility], 07/31/18 9:05:00 CDT  ?  3.?Hypercholesterolemia  ?CMP and FLP today  Ordered:  Office/Outpatient Visit Level 4 Established [id number] PC, Diabetes mellitus type 2  Hypertension  Hypercholesterolemia, [facility], 07/31/18 9:05:00 CDT  ?   Problem List/Past Medical History  Ongoing  Diabetes mellitus type 2  GERD - Gastro-esophageal reflux disease  Hypercholesterolemia  Hypertension  Historical  Asthma  Procedure/Surgical History  Abdominal hysterectomy  Dilation and curettage   Medications  aspirin 81 mg oral tablet, 81 mg= 1 tab(s), Oral, BID  Crestor 5 mg oral tablet, 5 mg= 1 tab(s), Oral, Daily, 5  refills  hydrochlorothiazide-lisinopril 12.5 mg-20 mg oral tablet, 1 tab(s), Oral, Daily, 3 refills  LEVOCETIRIZI TAB 5MG, 5 mg= 1 tab(s), Oral, Daily  MELOXICAM TAB 15MG, 15 mg= 1 tab(s), Oral, Daily  one touch ultra lancets, See Instructions, 11 refills  Prenatal Multivitamins, Oral, Daily  Trulicity Pen 1.5 mg/0.5 mL subcutaneous solution, 1.5 mg, Subcutaneous, qWeek, 3 refills  Vitamin C 100 mg oral tablet, chewable, 100 mg= 1 tab(s), Chewed, Daily  Zinc, 140 mg, Oral, Daily  Allergies  Bactrim?(unknown)  codeine?(unknown)  penicillin?(unknown)  Social History  Alcohol  Current, 1-2 times per month, 03/27/2018  Employment/School  Employed, 03/27/2018  Home/Environment  Lives with Children., 03/28/2018  Tobacco  Never smoker, 03/27/2018  Family History  Dementia: Mother.  Diabetes mellitus type 2: Mother, Sister, Sister, Sister and Daughter.  Hypertension.: Mother.  Pancreatic cancer: Father.  Immunizations  Vaccine Date Status   tetanus/diphtheria/pertussis, acel(Tdap) 01/24/2013 Recorded   Health Maintenance  Health Maintenance  ???Pending?(in the next year)  ??? ??Due?  ??? ? ? ?Alcohol Misuse Screening due??07/31/18??and every 1??year(s)  ??? ? ? ?Cervical Cancer Screening due??07/31/18??and every?  ??? ? ? ?Colorectal Screening due??07/31/18??and every?  ??? ? ? ?Depression Screening due??07/31/18??and every?  ??? ? ? ?Diabetes Maintenance-Eye Exam due??07/31/18??and every?  ??? ? ? ?Diabetes Maintenance-Foot Exam due??07/31/18??and every?  ??? ? ? ?Diabetes Maintenance-Urine Dipstick due??07/31/18??Variable frequency  ??? ? ? ?Hypertension Maintenance-Medication Prescribed due??07/31/18??and every 1??year(s)  ??? ? ? ?Hypertension Management-Education due??07/31/18??and every 1??year(s)  ??? ? ? ?Influenza Vaccine due??07/31/18??and every?  ??? ? ? ?Smoking Cessation due??07/31/18??and every 1??year(s)  ??? ? ? ?Zoster Vaccine due??07/31/18??and every 100??year(s)  ??? ??Due In Future?  ??? ? ? ?Diabetes  Maintenance-HgbA1c not due until??03/28/19??and every 1??year(s)  ??? ? ? ?Diabetes Maintenance-Fasting Lipid Profile not due until??03/28/19??and every 1??year(s)  ??? ? ? ?Hypertension Management-BMP not due until??03/28/19??and every 1??year(s)  ??? ? ? ?Aspirin Therapy for CVD Prevention not due until??03/28/19??and every 1??year(s)  ??? ? ? ?Diabetes Maintenance-Microalbumin not due until??03/28/19??and every 1??year(s)  ??? ? ? ?Blood Pressure Screening not due until??06/27/19??and every 1??year(s)  ??? ? ? ?Body Mass Index Check not due until??06/27/19??and every 1??year(s)  ??? ? ? ?Hypertension Management-Blood Pressure not due until??06/27/19??and every 1??year(s)  ??? ? ? ?Obesity Screening not due until??06/27/19??and every 1??year(s)  ???Satisfied?(in the past 1 year)  ??? ??Satisfied?  ??? ? ? ?Aspirin Therapy for CVD Prevention on??03/28/18.  ??? ? ? ?Blood Pressure Screening on??07/31/18.??Satisfied by Michell Staples  ??? ? ? ?Body Mass Index Check on??06/27/18.??Satisfied by Yara Gordillo  ??? ? ? ?Breast Cancer Screening on??04/10/18.??Satisfied by Giovanna Li  ??? ? ? ?Diabetes Maintenance-Serum Creatinine on??07/31/18.??Satisfied by Luiz Antonio  ??? ? ? ?Diabetes Screening on??07/31/18.??Satisfied by Luiz Antonio  ??? ? ? ?Hypertension Management-BMP on??07/31/18.??Satisfied by Luiz Antonio  ??? ? ? ?Lipid Screening on??07/31/18.??Satisfied by Luiz Antonio  ??? ? ? ?Obesity Screening on??06/27/18.??Satisfied by Yaar Gordillo  ?  ?  Lab Results  Test Name Test Result Date/Time   Hgb A1c 5.7 % 03/28/2018 08:40 CDT

## 2022-05-03 NOTE — HISTORICAL [facility]
This is a historical note converted from Edmund. Formatting and pictures may have been removed.  Please reference Edmund for original formatting and attached multimedia. Chief Complaint  CPX NOT FASTING  History of Present Illness  66?year old female presents for wellness visit.  Blood Pressure - 136/80  BMI - 37  Immunizations -?patient declines pneumonia and shingles vaccine  Breast Cancer Screening - Screening Mammogram September 9/2/20  Cervical Cancer Screening -?total vaginal hysterectomy in 2005 for benign indications  Colon Cancer Screening -? Cologuard negative 8/21/20  Diet - Average  Exercise - Minimal  ?   DM II - Hemoglobin A1c?6.3 5/27/21.? Pt treating with Trulicity 0.75 mg weekly.? Pt tolerating well without any side effects.?  HTN -remains well controlled with?lisinopril HCTZ 20/12.5 mg daily  Anxiety and irritability. ?Previously intolerant to Lexapro and Zoloft.? Tx with Buspar 5 mg BID at last office visit.??Patient tolerated well and did feel it was helpful although was not consistently taking it twice daily so she did not refill.  Hypercholesterolemia managed with rosuvastatin 5 mg daily.? LDL 80 5/27/21  GERD-intermittent?pyrosis which is controlled with?Lorena-[place] as needed.  Review of Systems  Constitutional:?No weight loss, no fever, no fatigue, no chills, no night sweats,?no weakness  Eyes:?No blurred vision,?no redness,?no drainage,?no ocular?pain  HEENT:?No sore throat,?no ear pain, no sinus pressure, no nasal congestion, no rhinorrhea, no postnasal drip  Respiratory:?No cough, no wheezing, no sputum production, no shortness of breath  Cardiovascular:?No chest pain, no palpitations, no dyspnea on exertion,?no orthopnea  Gastrointestinal:?No nausea, no vomiting, no abdominal pain, no diarrhea,?no constipation, no melena,?no hematochezia  Genitourinary:?No dysuria, no hematuria, no frequency, no urgency, no incontinence, no discharge  Musculoskeletal:?No myalgias, no arthralgias, no  weakness, no joint effusion, no edema  Integumentary:?No rashes, no hives, no itching, no lesions, no jaundice  Neurologic:?No headaches, no numbness, no tingling, no weakness, no dizziness  Psychiatric:?anxiety,?irritability, no depression,?no suicidal ideations, no?homicidal ideations,?no delusions, no hallucinations  Endocrine:?No polyuria, no polydipsia, no polyphagia  Hematology:?No bruising, no lymphadenopathy,?no paleness  ?  Physical Exam  Vitals & Measurements  HR:?74(Peripheral)? BP:?136/80? SpO2:?97%?  HT:?162.00?cm? WT:?97.100?kg? BMI:?37?  General:?Well developed, Well-nourished, in No acute distress, A&O x 4  Eye:?PERRLA, EOMI, Clear conjunctiva, Eyelids unremarkable  Ears:?Bilateral EAC clear?and?Bilateral TM clear  Nose:? Mucosa normal, No rhinorrhea, No lesions  O/P:??No?erythema,?No tonsillar hypertrophy,?No tonsillar exudates,?No cobblestoning  Neck:?Soft, Nontender, No thyromegaly, Full range of motion, No cervical lymphadenopathy, No JVD  Cardiovascular:?Regular rate and rhythm, No murmurs, No gallops, No rubs  Respiratory:?Clear to auscultation bilaterally, No wheezes, No rhonchi, No?crackles  Abdomen:? Soft, Nontender, No hepatosplenomegaly, Normal and equal bowel sounds, No masses, No rebound, No guarding  Musculoskeletal:? No tenderness, FROM, No joint abnormality, No clubbing, No cyanosis,No?edema  Neurologic:? No motor or sensory deficits, Reflexes 2+ throughout, CN II-XII grossly intact  Integumentary:?No rashes or skin lesions  Psychiatric:?Good insight,?appropriate thought process, normal affect,?appropriate?speech,  non-suicidal, cooperative, appropriate judgment  Assessment/Plan  1.?Wellness examination?Z00.00  ?Discussed the?importance of maintaining a balanced diet and regular exercise  CBC, CMP, FLP, TSH today  Ordered:  Clinic Follow up, *Est. 02/16/22 8:30:00 CST, Order for future visit, Breast cancer screening, [facility]  Preventative Health Care Est 40-64 years [id number] PC,  Wellness examination, [facility], 08/10/21 9:40:00 CDT  ?  2.?Diabetes mellitus type 2?E11.9  ?Microalbumin and hemoglobin A1c today  Ordered:  Clinic Follow up, *Est. 02/16/22 8:30:00 CST, Order for future visit, Breast cancer screening, [facility]  ?  3.?CORINNA (generalized anxiety disorder)?F41.1  ?Recommend restarting buspirone?5 mg twice daily  Ordered:  Clinic Follow up, *Est. 02/16/22 8:30:00 CST, Order for future visit, Breast cancer screening, [facility]  ?  4.?GERD - Gastro-esophageal reflux disease?K21.9  ?Continue?Lorena-[place] as needed along with diet and lifestyle modifications  Ordered:  Clinic Follow up, *Est. 02/16/22 8:30:00 CST, Order for future visit, Breast cancer screening, [facility]  ?  5.?Hypertension?I10  ?Well-controlled with lisinopril HCTZ?20/12.5 mg daily  Ordered:  Clinic Follow up, *Est. 02/16/22 8:30:00 CST, Order for future visit, Breast cancer screening, [facility]  ?  6.?Hypercholesterolemia?E78.00  ?CMP and FLP today  ?  7.?Breast cancer screening?Z12.39  Ordered:  Clinic Follow up, *Est. 02/16/22 8:30:00 CST, Order for future visit, Breast cancer screening, [facility]  Schedule Diagnostics Study, Screening Mammogram, [facility], 08/10/21 9:39:00 CDT, Breast cancer screening  ?  8.?Myalgia?M79.10  ?CBC, CMP, TSH, magnesium today  ?  Orders:  busPIRone, 5 mg = 1 tab(s), Oral, BID, # 60 tab(s), 5 Refill(s), Pharmacy: [facility]/pharmacy #[id number], 162, cm, Height/Length Dosing, 08/10/21 8:50:00 CDT, 97.1, kg, Weight Dosing, 08/10/21 8:50:00 CDT  Referrals  Clinic Follow up, *Est. 02/16/22 8:30:00 CST, Order for future visit, Breast cancer screening, [facility]   Problem List/Past Medical History  Ongoing  Asthma  Diabetes mellitus type 2  CORINNA (generalized anxiety disorder)  GERD - Gastro-esophageal reflux disease  Hypercholesterolemia  Hypertension  Seasonal allergies  Historical  No qualifying data  Procedure/Surgical History  Total hysterectomy (01/03/2005)  Abdominal hysterectomy  Dilation and  curettage   Medications  aspirin 81 mg oral tablet, 81 mg= 1 tab(s), Oral, Daily  B 50 Complex  busPIRone 5 mg oral tablet, 5 mg= 1 tab(s), Oral, BID, 5 refills  Centrum Adults oral tablet, Oral, Daily  hydrochlorothiazide-lisinopril 12.5 mg-20 mg oral tablet, See Instructions  LEVOCETIRIZI TAB 5MG, 5 mg= 1 tab(s), Oral, Daily  meloxicam 15 mg oral tablet, See Instructions  montelukast 10 mg oral TABLET, 10 mg= 1 tab(s), Oral, Daily  one touch ultra lancets, See Instructions, 11 refills  ONE TOUCH ULTRA TEST STRIPS, See Instructions, 5 refills  rosuvastatin 5 mg oral tablet, See Instructions  Symbicort 160 mcg-4.5 mcg/inh inhalation aerosol, 2 puff(s), INH, BID  Trulicity Pen 0.75 mg/0.5 mL subcutaneous solution, 0.75 mg= 0.5 mL, Subcutaneous, qWeek, 3 refills  Ventolin HFA 90 mcg/inh inhalation aerosol, 2 puff(s), INH, QID  Vitamin C 100 mg oral tablet, chewable, 100 mg= 1 tab(s), Chewed, Daily  Allergies  Bactrim?(unknown)  codeine?(unknown)  penicillin?(unknown)  Social History  Abuse/Neglect  No, 08/10/2021  Alcohol  Current, 1-2 times per month, 03/27/2018  Employment/School  Employed, 03/27/2018  Home/Environment  Lives with Children., 03/28/2018  Tobacco  Never (less than 100 in lifetime), N/A, 08/10/2021  Never (less than 100 in lifetime), N/A, 04/15/2019  Never smoker, 03/27/2018  Family History  Dementia: Mother.  Diabetes mellitus type 2: Mother, Sister, Sister, Sister and Daughter.  Hypertension.: Mother.  Pancreatic cancer: Father.  Daughter: History is negative  Immunizations  Vaccine Date Status   tetanus/diphtheria/pertussis, acel(Tdap) 01/24/2013 Recorded   Health Maintenance  Health Maintenance  ???Pending?(in the next year)  ??? ??OverDue  ??? ? ? ?Hypertension Maintenance-Medication Prescribed due??04/15/20??and every 1??year(s)  ??? ? ? ?Hypertension Management-Education due??04/15/20??and every 1??year(s)  ??? ? ? ?Alcohol Misuse Screening due??01/02/21??and every 1??year(s)  ??? ? ? ?Diabetes  Maintenance-Foot Exam due??07/15/21??and every 1??year(s)  ??? ??Due?  ??? ? ? ?Aspirin Therapy for CVD Prevention due??07/15/21??and every 1??year(s)  ??? ? ? ?ADL Screening due??08/10/21??and every 1??year(s)  ??? ? ? ?Asthma Management-Asthma Education due??08/10/21??and every 6??month(s)  ??? ? ? ?Asthma Management-Spirometry due??08/10/21??Variable frequency  ??? ? ? ?Asthma Management-[place] Flow due??08/10/21??Variable frequency  ??? ? ? ?Asthma Management-Written Action Plan due??08/10/21??and every 6??month(s)  ??? ? ? ?Depression Screening due??08/10/21??Unknown Frequency  ??? ? ? ?Diabetes Maintenance-Eye Exam due??08/10/21??Unknown Frequency  ??? ? ? ?Zoster Vaccine due??08/10/21??Unknown Frequency  ??? ??Due In Future?  ??? ? ? ?Obesity Screening not due until??01/01/22??and every 1??year(s)  ??? ? ? ?Diabetes Maintenance-HgbA1c not due until??05/27/22??and every 1??year(s)  ??? ? ? ?Hypertension Management-BMP not due until??05/27/22??and every 1??year(s)  ???Satisfied?(in the past 1 year)  ??? ??Satisfied?  ??? ? ? ?Blood Pressure Screening on??08/10/21.??Satisfied by Yara Gordillo LPN  ??? ? ? ?Body Mass Index Check on??08/10/21.??Satisfied by Yara Gordillo LPN  ??? ? ? ?Breast Cancer Screening on??09/02/20.??Satisfied by Irasema De La Torre  ??? ? ? ?Diabetes Maintenance-Fasting Lipid Profile on??08/10/21.??Satisfied by Jud Valente  ??? ? ? ?Diabetes Maintenance-HgbA1c on??08/10/21.??Satisfied by Jud Valente  ??? ? ? ?Diabetes Maintenance-Microalbumin on??08/10/21.??Satisfied by Imelda Cain  ??? ? ? ?Diabetes Maintenance-Serum Creatinine on??08/10/21.??Satisfied by Jud Valente  ??? ? ? ?Diabetes Screening on??08/10/21.??Satisfied by Jud Valente  ??? ? ? ?Hypertension Management-BMP on??08/10/21.??Satisfied by Imelda Cain  ??? ? ? ?Hypertension Management-Blood Pressure on??08/10/21.??Satisfied by Yara Gordillo LPN  ??? ? ? ?Influenza Vaccine  on??02/18/21.??Satisfied by Yara Gordillo LPN  ??? ? ? ?Lipid Screening on??08/10/21.??Satisfied by Jud Valente  ??? ? ? ?Obesity Screening on??08/10/21.??Satisfied by Yara Gordillo LPN  ?

## 2022-05-03 NOTE — HISTORICAL [facility]ER
This is a historical note converted from Edmund. Formatting and pictures may have been removed.  Please reference Edmund for original formatting and attached multimedia. Chief Complaint  3 MTH F/U  History of Present Illness  Hemoglobin A1c?8.4 in January.? Trulicity 0.75 mg weekly restarted.? Pt tolerating well without any side effects.?  HTN -remains well controlled with?lisinopril HCTZ 20/12.5 mg daily  Continues to struggle?with anxiety and irritability. ?Previously intolerant to Lexapro and Zoloft.  Hypercholesterolemia managed with rosuvastatin 5 mg daily.? LDL 72 in July.  Review of Systems  Constitutional:?No weight loss, no fever, no fatigue, no chills, no night sweats,?no weakness  Eyes:?No blurred vision,?no redness,?no drainage,?no ocular?pain  HEENT:?No sore throat,?no ear pain, no sinus pressure, no nasal congestion, no rhinorrhea, no postnasal drip  Respiratory:?No cough, no wheezing, no sputum production, no shortness of breath  Cardiovascular:?No chest pain, no palpitations, no dyspnea on exertion,?no orthopnea  Gastrointestinal:?No nausea, no vomiting, no abdominal pain, no diarrhea,?no constipation, no melena,?no hematochezia  Genitourinary:?No dysuria, no hematuria, no frequency, no urgency, no incontinence, no discharge  Musculoskeletal:?No myalgias, no arthralgias, no weakness, no joint effusion, no edema  Integumentary:?No rashes, no hives, no itching, no lesions, no jaundice  Neurologic:?No headaches, no numbness, no tingling, no weakness, no dizziness  Psychiatric:?anxiety, irritability, no depression,?no suicidal ideations, no?homicidal ideations,?no delusions, no hallucinations  Endocrine:?No polyuria, no polydipsia, no polyphagia  Hematology:?No bruising, no lymphadenopathy,?no paleness  ?  Physical Exam  Vitals & Measurements  HR:?73(Peripheral)? BP:?130/84? SpO2:?96%?  HT:?162.00?cm? WT:?95.400?kg? BMI:?36.35?  General:?Well developed, Well-nourished, in No acute distress, A&O x  4  Cardiovascular:?Regular rate and rhythm, No murmurs, No gallops, No rubs  Respiratory:?Clear to auscultation bilaterally, No wheezes, No rhonchi, No?crackles  Abdomen:? Soft, Nontender, No hepatosplenomegaly, Normal and equal bowel sounds, No masses, No rebound, No guarding  Musculoskeletal:? No tenderness, FROM, No joint abnormality, No clubbing, No cyanosis,No?edema  Integumentary:?No rashes or skin lesions  Psychiatric:?Good insight,?appropriate thought process, normal affect,?appropriate?speech,  non-suicidal, cooperative, appropriate judgment  Assessment/Plan  1.?Diabetes mellitus type 2?E11.9  ?Encourage patient to schedule diabetic eye exam  Ordered:  Clinic Follow up, *Est. 08/27/21 3:00:00 CDT, Wellness Physical, Order for future visit, Diabetes mellitus type 2  Hypertension  Hypercholesterolemia  CORINNA (generalized anxiety disorder), [facility] AFP  Hemoglobin A1c, Routine collect, 05/27/21 8:51:00 CDT, Blood, Stop date 05/27/21 8:51:00 CDT, Lab Collect, Diabetes mellitus type 2, 05/27/21 8:51:00 CDT  Office/Outpatient Visit Level 4 Established [id number] PC, Diabetes mellitus type 2  Hypertension  Hypercholesterolemia  CORINNA (generalized anxiety disorder), INK [facility]B - AFP, 05/27/21 8:44:00 CDT  ?  2.?Hypertension?I10  ?Continue lisinopril HCTZ 20/12.5 mg daily  Ordered:  Clinic Follow up, *Est. 08/27/21 3:00:00 CDT, Wellness Physical, Order for future visit, Diabetes mellitus type 2  Hypertension  Hypercholesterolemia  CORINNA (generalized anxiety disorder), ink AFP  Comprehensive Metabolic Panel, Routine collect, 05/27/21 8:51:00 CDT, Blood, Stop date 05/27/21 8:51:00 CDT, Lab Collect, Hypertension, 05/27/21 8:51:00 CDT  Office/Outpatient Visit Level 4 Established [id number] PC, Diabetes mellitus type 2  Hypertension  Hypercholesterolemia  CORINNA (generalized anxiety disorder), [facility] AMB - AFP, 05/27/21 8:44:00 CDT  ?  3.?Hypercholesterolemia?E78.00  Ordered:  Clinic Follow up, *Est. 08/27/21 3:00:00 CDT,  Wellness Physical, Order for future visit, Diabetes mellitus type 2  Hypertension  Hypercholesterolemia  CORINNA (generalized anxiety disorder), [facility]  Lipid Panel, Routine collect, 05/27/21 8:51:00 CDT, Blood, Stop date 05/27/21 8:51:00 CDT, Lab Collect, Hypercholesterolemia, 05/27/21 8:51:00 CDT  Office/Outpatient Visit Level 4 Established [id number] PC, Diabetes mellitus type 2  Hypertension  Hypercholesterolemia  CORINNA (generalized anxiety disorder), [facility], 05/27/21 8:44:00 CDT  ?  4.?CORINNA (generalized anxiety disorder)?F41.1  ?Coping skills, stress management and treatment options discussed at length  Start buspirone 5 mg twice daily  Ordered:  Clinic Follow up, *Est. 08/27/21 3:00:00 CDT, Wellness Physical, Order for future visit, Diabetes mellitus type 2  Hypertension  Hypercholesterolemia  CORINNA (generalized anxiety disorder), [facility]  Office/Outpatient Visit Level 4 Established [id number] , Diabetes mellitus type 2  Hypertension  Hypercholesterolemia  CORINNA (generalized anxiety disorder), [facility], 05/27/21 8:44:00 CDT  ?  Orders:  busPIRone, 5 mg = 1 tab(s), Oral, BID, # 60 tab(s), 2 Refill(s), Pharmacy: [facility]/pharmacy #[id number], 162, cm, Height/Length Dosing, 05/27/21 8:29:00 CDT, 95.4, kg, Weight Dosing, 05/27/21 8:29:00 CDT  Referrals  Clinic Follow up, *Est. 08/27/21 3:00:00 CDT, Wellness Physical, Order for future visit, Diabetes mellitus type 2  Hypertension  Hypercholesterolemia  CORINNA (generalized anxiety disorder), [facility]  Clinic Follow up, Order for future visit   Problem List/Past Medical History  Ongoing  Asthma  Diabetes mellitus type 2  CORINNA (generalized anxiety disorder)  GERD - Gastro-esophageal reflux disease  Hypercholesterolemia  Hypertension  Seasonal allergies  Historical  No qualifying data  Procedure/Surgical History  Total hysterectomy (01/03/2005)  Abdominal hysterectomy  Dilation and curettage   Medications  aspirin 81 mg oral tablet, 81 mg= 1 tab(s), Oral, Daily  B  50 Complex  busPIRone 5 mg oral tablet, 5 mg= 1 tab(s), Oral, BID, 2 refills  Centrum Adults oral tablet, Oral, Daily  hydrochlorothiazide-lisinopril 12.5 mg-20 mg oral tablet, See Instructions  LEVOCETIRIZI TAB 5MG, 5 mg= 1 tab(s), Oral, Daily  meloxicam 15 mg oral tablet, See Instructions  montelukast 10 mg oral TABLET, 10 mg= 1 tab(s), Oral, Daily  one touch ultra lancets, See Instructions, 11 refills  ONE TOUCH ULTRA TEST STRIPS, See Instructions, 5 refills  rosuvastatin 5 mg oral tablet, See Instructions  Symbicort 160 mcg-4.5 mcg/inh inhalation aerosol, 2 puff(s), INH, BID  Trulicity Pen 0.75 mg/0.5 mL subcutaneous solution, 0.75 mg= 0.5 mL, Subcutaneous, qWeek, 3 refills  Ventolin HFA 90 mcg/inh inhalation aerosol, 2 puff(s), INH, QID  Vitamin C 100 mg oral tablet, chewable, 100 mg= 1 tab(s), Chewed, Daily  Allergies  Bactrim?(unknown)  codeine?(unknown)  penicillin?(unknown)  Social History  Abuse/Neglect  No, 05/27/2021  Alcohol  Current, 1-2 times per month, 03/27/2018  Employment/School  Employed, 03/27/2018  Home/Environment  Lives with Children., 03/28/2018  Tobacco  Never (less than 100 in lifetime), N/A, 05/27/2021  Never (less than 100 in lifetime), N/A, 04/15/2019  Never smoker, 03/27/2018  Family History  Dementia: Mother.  Diabetes mellitus type 2: Mother, Sister, Sister, Sister and Daughter.  Hypertension.: Mother.  Pancreatic cancer: Father.  Daughter: History is negative  Immunizations  Vaccine Date Status   tetanus/diphtheria/pertussis, acel(Tdap) 01/24/2013 Recorded   Health Maintenance  Health Maintenance  ???Pending?(in the next year)  ??? ??OverDue  ??? ? ? ?Hypertension Maintenance-Medication Prescribed due??04/15/20??and every 1??year(s)  ??? ? ? ?Hypertension Management-Education due??04/15/20??and every 1??year(s)  ??? ? ? ?Influenza Vaccine due??10/01/20??and every 1??day(s)  ??? ? ? ?Alcohol Misuse Screening due??01/02/21??and every 1??year(s)  ??? ??Due?  ??? ? ? ?ADL Screening  due??05/27/21??and every 1??year(s)  ??? ? ? ?Asthma Management-Arellano Peak Flow due??05/27/21??Variable frequency  ??? ? ? ?Asthma Management-Spirometry due??05/27/21??Variable frequency  ??? ? ? ?Asthma Management-Asthma Education due??05/27/21??and every 6??month(s)  ??? ? ? ?Asthma Management-Written Action Plan due??05/27/21??and every 6??month(s)  ??? ? ? ?Depression Screening due??05/27/21??Unknown Frequency  ??? ? ? ?Diabetes Maintenance-Eye Exam due??05/27/21??Unknown Frequency  ??? ? ? ?Zoster Vaccine due??05/27/21??Unknown Frequency  ??? ??Due In Future?  ??? ? ? ?Diabetes Maintenance-Foot Exam not due until??07/15/21??and every 1??year(s)  ??? ? ? ?Diabetes Maintenance-Fasting Lipid Profile not due until??07/15/21??and every 1??year(s)  ??? ? ? ?Aspirin Therapy for CVD Prevention not due until??07/15/21??and every 1??year(s)  ??? ? ? ?Obesity Screening not due until??01/01/22??and every 1??year(s)  ??? ? ? ?Diabetes Maintenance-HgbA1c not due until??01/13/22??and every 1??year(s)  ??? ? ? ?Hypertension Management-BMP not due until??01/13/22??and every 1??year(s)  ??? ? ? ?Diabetes Maintenance-Serum Creatinine not due until??01/13/22??and every 1??year(s)  ???Satisfied?(in the past 1 year)  ??? ??Satisfied?  ??? ? ? ?Aspirin Therapy for CVD Prevention on??07/15/20.??Satisfied by Surekha Lott MD, Richard A??Reason: Expectation Satisfied Elsewhere  ??? ? ? ?Blood Pressure Screening on??05/27/21.??Satisfied by Yara Gordillo LPN  ??? ? ? ?Body Mass Index Check on??05/27/21.??Satisfied by Yara Gordillo LPN  ??? ? ? ?Breast Cancer Screening on??09/02/20.??Satisfied by Irasema De La Torre  ??? ? ? ?Diabetes Maintenance-Fasting Lipid Profile on??07/15/20.??Satisfied by Luiz Antonio  ??? ? ? ?Diabetes Maintenance-Foot Exam on??07/15/20.??Satisfied by Surekha Lott MD, Guillermo PEREZ  ??? ? ? ?Diabetes Maintenance-HgbA1c on??01/13/21.??Satisfied by Jud Valente  ??? ? ? ?Diabetes Maintenance-Microalbumin  on??07/15/20.??Satisfied by Luiz Antonio  ??? ? ? ?Diabetes Maintenance-Serum Creatinine on??01/13/21.??Satisfied by Jud Valente  ??? ? ? ?Diabetes Screening on??01/13/21.??Satisfied by Jud Valente  ??? ? ? ?Hypertension Management-BMP on??01/13/21.??Satisfied by Jud Valente  ??? ? ? ?Hypertension Management-Blood Pressure on??05/27/21.??Satisfied by Yara Gordillo LPN  ??? ? ? ?Influenza Vaccine on??02/18/21.??Satisfied by Yara Gordillo LPN  ??? ? ? ?Lipid Screening on??07/15/20.??Satisfied by Luiz Antonio  ??? ? ? ?Obesity Screening on??05/27/21.??Satisfied by Yara Gordillo LPN  ??? ??Refused?  ??? ? ? ?Zoster Vaccine on??07/15/20.??Recorded by Surekha Lott MD, Richard A??Reason: Patient Refuses  ?

## 2022-07-29 ENCOUNTER — PATIENT OUTREACH (OUTPATIENT)
Dept: ADMINISTRATIVE | Facility: HOSPITAL | Age: 67
End: 2022-07-29

## 2022-07-29 NOTE — PROGRESS NOTES
The following record(s)  below were uploaded for Health Maintenance .    09.02.2020 MAMMOGRAM SCREENING              08.13.2020.HUGO

## 2023-01-13 PROBLEM — J30.9 ALLERGIC RHINITIS: Status: ACTIVE | Noted: 2022-11-05

## 2023-01-13 PROBLEM — E78.00 HYPERCHOLESTEROLEMIA: Status: ACTIVE | Noted: 2022-11-05

## 2023-01-13 PROBLEM — J45.909 ASTHMA: Status: ACTIVE | Noted: 2022-11-05

## 2023-01-13 PROBLEM — F41.1 GENERALIZED ANXIETY DISORDER: Status: ACTIVE | Noted: 2022-11-05

## 2023-01-13 PROBLEM — K21.9 GASTROESOPHAGEAL REFLUX DISEASE: Status: ACTIVE | Noted: 2022-11-05

## 2023-01-13 PROBLEM — I10 HYPERTENSION: Status: ACTIVE | Noted: 2023-01-13

## 2023-01-13 PROBLEM — E11.9 TYPE 2 DIABETES MELLITUS: Status: ACTIVE | Noted: 2022-11-05

## 2024-02-14 PROBLEM — N32.81 OAB (OVERACTIVE BLADDER): Status: ACTIVE | Noted: 2024-02-14

## 2024-02-14 PROBLEM — M17.11 PRIMARY OSTEOARTHRITIS OF RIGHT KNEE: Status: ACTIVE | Noted: 2024-02-14

## 2024-03-07 ENCOUNTER — HOSPITAL ENCOUNTER (OUTPATIENT)
Dept: RADIOLOGY | Facility: HOSPITAL | Age: 69
Discharge: HOME | End: 2024-03-07
Attending: FAMILY MEDICINE
Payer: MEDICARE

## 2024-03-07 DIAGNOSIS — Z12.31 BREAST CANCER SCREENING BY MAMMOGRAM: ICD-10-CM

## 2024-03-07 PROCEDURE — 77080 DXA BONE DENSITY AXIAL: CPT | Mod: TC

## 2024-03-07 PROCEDURE — 77067 SCR MAMMO BI INCL CAD: CPT | Mod: 26,,, | Performed by: STUDENT IN AN ORGANIZED HEALTH CARE EDUCATION/TRAINING PROGRAM

## 2024-03-07 PROCEDURE — 77080 DXA BONE DENSITY AXIAL: CPT | Mod: 26,,, | Performed by: RADIOLOGY

## 2024-03-07 PROCEDURE — 77063 BREAST TOMOSYNTHESIS BI: CPT | Mod: 26,,, | Performed by: STUDENT IN AN ORGANIZED HEALTH CARE EDUCATION/TRAINING PROGRAM

## 2024-03-07 PROCEDURE — 77067 SCR MAMMO BI INCL CAD: CPT | Mod: TC

## 2024-09-16 PROCEDURE — 87077 CULTURE AEROBIC IDENTIFY: CPT | Performed by: FAMILY MEDICINE

## 2024-09-16 PROCEDURE — 87086 URINE CULTURE/COLONY COUNT: CPT | Performed by: FAMILY MEDICINE

## 2024-09-16 PROCEDURE — 87186 SC STD MICRODIL/AGAR DIL: CPT | Performed by: FAMILY MEDICINE

## 2024-11-20 PROBLEM — M17.0 PRIMARY OSTEOARTHRITIS OF BOTH KNEES: Status: ACTIVE | Noted: 2024-02-14

## 2025-06-02 ENCOUNTER — HOSPITAL ENCOUNTER (OUTPATIENT)
Facility: HOSPITAL | Age: 70
LOS: 1 days | Discharge: HOME | End: 2025-06-03
Attending: INTERNAL MEDICINE | Admitting: SURGERY
Payer: COMMERCIAL

## 2025-06-02 DIAGNOSIS — K37 APPENDICITIS, UNSPECIFIED APPENDICITIS TYPE: Primary | ICD-10-CM

## 2025-06-02 DIAGNOSIS — R10.9 ABDOMINAL PAIN: ICD-10-CM

## 2025-06-02 LAB
BASOPHILS # BLD AUTO: 0.02 X10(3)/MCL
BASOPHILS NFR BLD AUTO: 0.1 %
EOSINOPHIL # BLD AUTO: 0 X10(3)/MCL (ref 0–0.9)
EOSINOPHIL NFR BLD AUTO: 0 %
ERYTHROCYTE [DISTWIDTH] IN BLOOD BY AUTOMATED COUNT: 13.1 % (ref 11.5–17)
HCT VFR BLD AUTO: 38.3 % (ref 37–47)
HGB BLD-MCNC: 11.9 G/DL (ref 12–16)
IMM GRANULOCYTES # BLD AUTO: 0.06 X10(3)/MCL (ref 0–0.04)
IMM GRANULOCYTES NFR BLD AUTO: 0.4 %
LYMPHOCYTES # BLD AUTO: 1.11 X10(3)/MCL (ref 0.6–4.6)
LYMPHOCYTES NFR BLD AUTO: 7.9 %
MCH RBC QN AUTO: 28.9 PG (ref 27–31)
MCHC RBC AUTO-ENTMCNC: 31.1 G/DL (ref 33–36)
MCV RBC AUTO: 93 FL (ref 80–94)
MONOCYTES # BLD AUTO: 0.78 X10(3)/MCL (ref 0.1–1.3)
MONOCYTES NFR BLD AUTO: 5.6 %
NEUTROPHILS # BLD AUTO: 12 X10(3)/MCL (ref 2.1–9.2)
NEUTROPHILS NFR BLD AUTO: 86 %
NRBC BLD AUTO-RTO: 0 %
PLATELET # BLD AUTO: 217 X10(3)/MCL (ref 130–400)
PMV BLD AUTO: 10 FL (ref 7.4–10.4)
RBC # BLD AUTO: 4.12 X10(6)/MCL (ref 4.2–5.4)
WBC # BLD AUTO: 13.97 X10(3)/MCL (ref 4.5–11.5)

## 2025-06-02 PROCEDURE — 80053 COMPREHEN METABOLIC PANEL: CPT | Performed by: INTERNAL MEDICINE

## 2025-06-02 PROCEDURE — 87086 URINE CULTURE/COLONY COUNT: CPT | Performed by: INTERNAL MEDICINE

## 2025-06-02 PROCEDURE — 81001 URINALYSIS AUTO W/SCOPE: CPT | Performed by: INTERNAL MEDICINE

## 2025-06-02 PROCEDURE — 85025 COMPLETE CBC W/AUTO DIFF WBC: CPT | Performed by: INTERNAL MEDICINE

## 2025-06-02 RX ORDER — ONDANSETRON HYDROCHLORIDE 2 MG/ML
4 INJECTION, SOLUTION INTRAVENOUS
Status: COMPLETED | OUTPATIENT
Start: 2025-06-02 | End: 2025-06-03

## 2025-06-03 ENCOUNTER — ANESTHESIA EVENT (OUTPATIENT)
Dept: SURGERY | Facility: HOSPITAL | Age: 70
End: 2025-06-03
Payer: COMMERCIAL

## 2025-06-03 ENCOUNTER — ANESTHESIA (OUTPATIENT)
Dept: SURGERY | Facility: HOSPITAL | Age: 70
End: 2025-06-03
Payer: COMMERCIAL

## 2025-06-03 VITALS
OXYGEN SATURATION: 94 % | WEIGHT: 224 LBS | HEIGHT: 63 IN | RESPIRATION RATE: 14 BRPM | BODY MASS INDEX: 39.69 KG/M2 | HEART RATE: 72 BPM | TEMPERATURE: 98 F | DIASTOLIC BLOOD PRESSURE: 72 MMHG | SYSTOLIC BLOOD PRESSURE: 150 MMHG

## 2025-06-03 PROBLEM — K37 APPENDICITIS: Status: RESOLVED | Noted: 2025-06-03 | Resolved: 2025-06-03

## 2025-06-03 PROBLEM — K37 APPENDICITIS: Status: ACTIVE | Noted: 2025-06-03

## 2025-06-03 LAB
ALBUMIN SERPL-MCNC: 3.6 G/DL (ref 3.4–4.8)
ALBUMIN/GLOB SERPL: 0.9 RATIO (ref 1.1–2)
ALP SERPL-CCNC: 57 UNIT/L (ref 40–150)
ALT SERPL-CCNC: 19 UNIT/L (ref 0–55)
ANION GAP SERPL CALC-SCNC: 11 MEQ/L
AST SERPL-CCNC: 20 UNIT/L (ref 11–45)
BACTERIA #/AREA URNS AUTO: ABNORMAL /HPF
BILIRUB SERPL-MCNC: 0.4 MG/DL
BILIRUB UR QL STRIP.AUTO: NEGATIVE
BUN SERPL-MCNC: 13.1 MG/DL (ref 9.8–20.1)
CALCIUM SERPL-MCNC: 9.2 MG/DL (ref 8.4–10.2)
CHLORIDE SERPL-SCNC: 104 MMOL/L (ref 98–107)
CLARITY UR: CLEAR
CO2 SERPL-SCNC: 23 MMOL/L (ref 23–31)
COLOR UR AUTO: YELLOW
CREAT SERPL-MCNC: 0.75 MG/DL (ref 0.55–1.02)
CREAT/UREA NIT SERPL: 17
GFR SERPLBLD CREATININE-BSD FMLA CKD-EPI: >60 ML/MIN/1.73/M2
GLOBULIN SER-MCNC: 3.9 GM/DL (ref 2.4–3.5)
GLUCOSE SERPL-MCNC: 144 MG/DL (ref 82–115)
GLUCOSE UR QL STRIP: NORMAL
HGB UR QL STRIP: NEGATIVE
HOLD SPECIMEN: NORMAL
HYALINE CASTS #/AREA URNS LPF: ABNORMAL /LPF
KETONES UR QL STRIP: ABNORMAL
LACTATE SERPL-SCNC: 1.3 MMOL/L (ref 0.5–2.2)
LEUKOCYTE ESTERASE UR QL STRIP: 75
MUCOUS THREADS URNS QL MICRO: ABNORMAL /LPF
NITRITE UR QL STRIP: NEGATIVE
PH UR STRIP: 5.5 [PH]
POCT GLUCOSE: 124 MG/DL (ref 70–110)
POCT GLUCOSE: 207 MG/DL (ref 70–110)
POTASSIUM SERPL-SCNC: 4.1 MMOL/L (ref 3.5–5.1)
PROT SERPL-MCNC: 7.5 GM/DL (ref 5.8–7.6)
PROT UR QL STRIP: ABNORMAL
RBC #/AREA URNS AUTO: ABNORMAL /HPF
SODIUM SERPL-SCNC: 138 MMOL/L (ref 136–145)
SP GR UR STRIP.AUTO: 1.03 (ref 1–1.03)
SQUAMOUS #/AREA URNS LPF: ABNORMAL /HPF
UROBILINOGEN UR STRIP-ACNC: NORMAL
WBC #/AREA URNS AUTO: ABNORMAL /HPF

## 2025-06-03 PROCEDURE — G0378 HOSPITAL OBSERVATION PER HR: HCPCS

## 2025-06-03 PROCEDURE — 88304 TISSUE EXAM BY PATHOLOGIST: CPT | Mod: TC,59 | Performed by: SURGERY

## 2025-06-03 PROCEDURE — 63600175 PHARM REV CODE 636 W HCPCS: Performed by: INTERNAL MEDICINE

## 2025-06-03 PROCEDURE — 63600175 PHARM REV CODE 636 W HCPCS: Performed by: ANESTHESIOLOGY

## 2025-06-03 PROCEDURE — 27201423 OPTIME MED/SURG SUP & DEVICES STERILE SUPPLY: Performed by: SURGERY

## 2025-06-03 PROCEDURE — 63600175 PHARM REV CODE 636 W HCPCS

## 2025-06-03 PROCEDURE — 25000003 PHARM REV CODE 250

## 2025-06-03 PROCEDURE — 71000033 HC RECOVERY, INTIAL HOUR: Performed by: SURGERY

## 2025-06-03 PROCEDURE — 63600175 PHARM REV CODE 636 W HCPCS: Performed by: SURGERY

## 2025-06-03 PROCEDURE — 87040 BLOOD CULTURE FOR BACTERIA: CPT | Performed by: INTERNAL MEDICINE

## 2025-06-03 PROCEDURE — 36000708 HC OR TIME LEV III 1ST 15 MIN: Performed by: SURGERY

## 2025-06-03 PROCEDURE — 25000003 PHARM REV CODE 250: Performed by: NURSE ANESTHETIST, CERTIFIED REGISTERED

## 2025-06-03 PROCEDURE — 36000709 HC OR TIME LEV III EA ADD 15 MIN: Performed by: SURGERY

## 2025-06-03 PROCEDURE — 37000009 HC ANESTHESIA EA ADD 15 MINS: Performed by: SURGERY

## 2025-06-03 PROCEDURE — 99900035 HC TECH TIME PER 15 MIN (STAT)

## 2025-06-03 PROCEDURE — 37000008 HC ANESTHESIA 1ST 15 MINUTES: Performed by: SURGERY

## 2025-06-03 PROCEDURE — 63600175 PHARM REV CODE 636 W HCPCS: Performed by: NURSE ANESTHETIST, CERTIFIED REGISTERED

## 2025-06-03 PROCEDURE — 83605 ASSAY OF LACTIC ACID: CPT | Performed by: INTERNAL MEDICINE

## 2025-06-03 PROCEDURE — 63600175 PHARM REV CODE 636 W HCPCS: Performed by: SPECIALIST

## 2025-06-03 RX ORDER — HYDROCHLOROTHIAZIDE 12.5 MG/1
12.5 TABLET ORAL DAILY
Status: DISCONTINUED | OUTPATIENT
Start: 2025-06-03 | End: 2025-06-03 | Stop reason: HOSPADM

## 2025-06-03 RX ORDER — MIDAZOLAM HYDROCHLORIDE 2 MG/2ML
2 INJECTION, SOLUTION INTRAMUSCULAR; INTRAVENOUS ONCE AS NEEDED
OUTPATIENT
Start: 2025-06-03 | End: 2036-10-30

## 2025-06-03 RX ORDER — ROCURONIUM BROMIDE 10 MG/ML
INJECTION, SOLUTION INTRAVENOUS
Status: DISCONTINUED | OUTPATIENT
Start: 2025-06-03 | End: 2025-06-03

## 2025-06-03 RX ORDER — MIDAZOLAM HYDROCHLORIDE 2 MG/2ML
2 INJECTION, SOLUTION INTRAMUSCULAR; INTRAVENOUS ONCE AS NEEDED
Status: COMPLETED | OUTPATIENT
Start: 2025-06-03 | End: 2025-06-03

## 2025-06-03 RX ORDER — SODIUM CHLORIDE, SODIUM LACTATE, POTASSIUM CHLORIDE, CALCIUM CHLORIDE 600; 310; 30; 20 MG/100ML; MG/100ML; MG/100ML; MG/100ML
1000 INJECTION, SOLUTION INTRAVENOUS
Status: COMPLETED | OUTPATIENT
Start: 2025-06-03 | End: 2025-06-03

## 2025-06-03 RX ORDER — SODIUM CHLORIDE, SODIUM LACTATE, POTASSIUM CHLORIDE, CALCIUM CHLORIDE 600; 310; 30; 20 MG/100ML; MG/100ML; MG/100ML; MG/100ML
INJECTION, SOLUTION INTRAVENOUS CONTINUOUS
Status: DISCONTINUED | OUTPATIENT
Start: 2025-06-03 | End: 2025-06-03

## 2025-06-03 RX ORDER — PROCHLORPERAZINE EDISYLATE 5 MG/ML
5 INJECTION INTRAMUSCULAR; INTRAVENOUS ONCE AS NEEDED
Status: DISCONTINUED | OUTPATIENT
Start: 2025-06-03 | End: 2025-06-03

## 2025-06-03 RX ORDER — ACETAMINOPHEN 500 MG
500 TABLET ORAL EVERY 6 HOURS
Qty: 56 TABLET | Refills: 0 | Status: SHIPPED | OUTPATIENT
Start: 2025-06-03 | End: 2025-06-17

## 2025-06-03 RX ORDER — LISINOPRIL AND HYDROCHLOROTHIAZIDE 12.5; 2 MG/1; MG/1
1 TABLET ORAL DAILY
Status: DISCONTINUED | OUTPATIENT
Start: 2025-06-03 | End: 2025-06-03

## 2025-06-03 RX ORDER — DEXAMETHASONE SODIUM PHOSPHATE 4 MG/ML
INJECTION, SOLUTION INTRA-ARTICULAR; INTRALESIONAL; INTRAMUSCULAR; INTRAVENOUS; SOFT TISSUE
Status: DISCONTINUED | OUTPATIENT
Start: 2025-06-03 | End: 2025-06-03

## 2025-06-03 RX ORDER — PROPOFOL 10 MG/ML
VIAL (ML) INTRAVENOUS
Status: DISCONTINUED | OUTPATIENT
Start: 2025-06-03 | End: 2025-06-03

## 2025-06-03 RX ORDER — ONDANSETRON HYDROCHLORIDE 2 MG/ML
INJECTION, SOLUTION INTRAVENOUS
Status: DISCONTINUED | OUTPATIENT
Start: 2025-06-03 | End: 2025-06-03

## 2025-06-03 RX ORDER — OXYCODONE HYDROCHLORIDE 5 MG/1
5 TABLET ORAL EVERY 4 HOURS PRN
Status: DISCONTINUED | OUTPATIENT
Start: 2025-06-03 | End: 2025-06-03 | Stop reason: HOSPADM

## 2025-06-03 RX ORDER — LIDOCAINE HYDROCHLORIDE 20 MG/ML
INJECTION INTRAVENOUS
Status: DISCONTINUED | OUTPATIENT
Start: 2025-06-03 | End: 2025-06-03

## 2025-06-03 RX ORDER — ALBUTEROL SULFATE 90 UG/1
2 INHALANT RESPIRATORY (INHALATION) EVERY 4 HOURS PRN
Status: DISCONTINUED | OUTPATIENT
Start: 2025-06-03 | End: 2025-06-03 | Stop reason: HOSPADM

## 2025-06-03 RX ORDER — LISINOPRIL 10 MG/1
20 TABLET ORAL DAILY
Status: DISCONTINUED | OUTPATIENT
Start: 2025-06-03 | End: 2025-06-03 | Stop reason: HOSPADM

## 2025-06-03 RX ORDER — GLUCAGON 1 MG
1 KIT INJECTION
Status: DISCONTINUED | OUTPATIENT
Start: 2025-06-03 | End: 2025-06-03

## 2025-06-03 RX ORDER — SODIUM CHLORIDE 0.9 % (FLUSH) 0.9 %
10 SYRINGE (ML) INJECTION
Status: DISCONTINUED | OUTPATIENT
Start: 2025-06-03 | End: 2025-06-03 | Stop reason: HOSPADM

## 2025-06-03 RX ORDER — ONDANSETRON HYDROCHLORIDE 2 MG/ML
4 INJECTION, SOLUTION INTRAVENOUS ONCE
Status: DISCONTINUED | OUTPATIENT
Start: 2025-06-03 | End: 2025-06-03

## 2025-06-03 RX ORDER — NALOXONE HCL 0.4 MG/ML
0.4 VIAL (ML) INJECTION
Status: DISCONTINUED | OUTPATIENT
Start: 2025-06-03 | End: 2025-06-03 | Stop reason: HOSPADM

## 2025-06-03 RX ORDER — VASOPRESSIN 20 [USP'U]/ML
INJECTION, SOLUTION INTRAMUSCULAR; SUBCUTANEOUS
Status: DISCONTINUED | OUTPATIENT
Start: 2025-06-03 | End: 2025-06-03

## 2025-06-03 RX ORDER — HYDROMORPHONE HYDROCHLORIDE 1 MG/ML
0.2 INJECTION, SOLUTION INTRAMUSCULAR; INTRAVENOUS; SUBCUTANEOUS EVERY 5 MIN PRN
Status: DISCONTINUED | OUTPATIENT
Start: 2025-06-03 | End: 2025-06-03

## 2025-06-03 RX ORDER — DEXMEDETOMIDINE HYDROCHLORIDE 100 UG/ML
INJECTION, SOLUTION INTRAVENOUS
Status: DISCONTINUED | OUTPATIENT
Start: 2025-06-03 | End: 2025-06-03

## 2025-06-03 RX ORDER — OXYCODONE AND ACETAMINOPHEN 5; 325 MG/1; MG/1
2 TABLET ORAL ONCE
Status: DISCONTINUED | OUTPATIENT
Start: 2025-06-03 | End: 2025-06-03

## 2025-06-03 RX ORDER — ONDANSETRON 4 MG/1
8 TABLET, ORALLY DISINTEGRATING ORAL EVERY 8 HOURS PRN
Status: DISCONTINUED | OUTPATIENT
Start: 2025-06-03 | End: 2025-06-03 | Stop reason: HOSPADM

## 2025-06-03 RX ORDER — FENTANYL CITRATE 50 UG/ML
INJECTION, SOLUTION INTRAMUSCULAR; INTRAVENOUS
Status: DISCONTINUED | OUTPATIENT
Start: 2025-06-03 | End: 2025-06-03

## 2025-06-03 RX ORDER — IBUPROFEN 200 MG
24 TABLET ORAL
Status: DISCONTINUED | OUTPATIENT
Start: 2025-06-03 | End: 2025-06-03 | Stop reason: HOSPADM

## 2025-06-03 RX ORDER — INSULIN ASPART 100 [IU]/ML
0-5 INJECTION, SOLUTION INTRAVENOUS; SUBCUTANEOUS
Status: DISCONTINUED | OUTPATIENT
Start: 2025-06-03 | End: 2025-06-03 | Stop reason: HOSPADM

## 2025-06-03 RX ORDER — IBUPROFEN 200 MG
16 TABLET ORAL
Status: DISCONTINUED | OUTPATIENT
Start: 2025-06-03 | End: 2025-06-03 | Stop reason: HOSPADM

## 2025-06-03 RX ORDER — DIPHENHYDRAMINE HYDROCHLORIDE 50 MG/ML
25 INJECTION, SOLUTION INTRAMUSCULAR; INTRAVENOUS EVERY 6 HOURS PRN
Status: DISCONTINUED | OUTPATIENT
Start: 2025-06-03 | End: 2025-06-03 | Stop reason: HOSPADM

## 2025-06-03 RX ORDER — HYDROCODONE BITARTRATE AND ACETAMINOPHEN 5; 325 MG/1; MG/1
1 TABLET ORAL EVERY 6 HOURS PRN
Qty: 10 TABLET | Refills: 0 | Status: SHIPPED | OUTPATIENT
Start: 2025-06-03

## 2025-06-03 RX ORDER — METHOCARBAMOL 500 MG/1
500 TABLET, FILM COATED ORAL 4 TIMES DAILY
Qty: 40 TABLET | Refills: 0 | Status: SHIPPED | OUTPATIENT
Start: 2025-06-03 | End: 2025-06-13

## 2025-06-03 RX ORDER — BUPIVACAINE HYDROCHLORIDE 2.5 MG/ML
INJECTION, SOLUTION EPIDURAL; INFILTRATION; INTRACAUDAL; PERINEURAL
Status: DISCONTINUED | OUTPATIENT
Start: 2025-06-03 | End: 2025-06-03 | Stop reason: HOSPADM

## 2025-06-03 RX ORDER — SODIUM CHLORIDE, SODIUM LACTATE, POTASSIUM CHLORIDE, CALCIUM CHLORIDE 600; 310; 30; 20 MG/100ML; MG/100ML; MG/100ML; MG/100ML
INJECTION, SOLUTION INTRAVENOUS CONTINUOUS
OUTPATIENT
Start: 2025-06-03

## 2025-06-03 RX ORDER — TALC
6 POWDER (GRAM) TOPICAL NIGHTLY PRN
Status: DISCONTINUED | OUTPATIENT
Start: 2025-06-03 | End: 2025-06-03 | Stop reason: HOSPADM

## 2025-06-03 RX ORDER — DIPHENHYDRAMINE HYDROCHLORIDE 50 MG/ML
25 INJECTION, SOLUTION INTRAMUSCULAR; INTRAVENOUS ONCE AS NEEDED
Status: DISCONTINUED | OUTPATIENT
Start: 2025-06-03 | End: 2025-06-03

## 2025-06-03 RX ORDER — GLUCAGON 1 MG
1 KIT INJECTION
Status: DISCONTINUED | OUTPATIENT
Start: 2025-06-03 | End: 2025-06-03 | Stop reason: HOSPADM

## 2025-06-03 RX ORDER — HEPARIN SODIUM 5000 [USP'U]/ML
5000 INJECTION, SOLUTION INTRAVENOUS; SUBCUTANEOUS ONCE
Status: COMPLETED | OUTPATIENT
Start: 2025-06-03 | End: 2025-06-03

## 2025-06-03 RX ORDER — GABAPENTIN 300 MG/1
300 CAPSULE ORAL 3 TIMES DAILY
Qty: 42 CAPSULE | Refills: 0 | Status: SHIPPED | OUTPATIENT
Start: 2025-06-03 | End: 2025-06-17

## 2025-06-03 RX ORDER — IPRATROPIUM BROMIDE AND ALBUTEROL SULFATE 2.5; .5 MG/3ML; MG/3ML
3 SOLUTION RESPIRATORY (INHALATION) ONCE AS NEEDED
Status: DISCONTINUED | OUTPATIENT
Start: 2025-06-03 | End: 2025-06-03

## 2025-06-03 RX ORDER — LIDOCAINE HYDROCHLORIDE 10 MG/ML
1 INJECTION, SOLUTION EPIDURAL; INFILTRATION; INTRACAUDAL; PERINEURAL ONCE AS NEEDED
Status: DISCONTINUED | OUTPATIENT
Start: 2025-06-03 | End: 2025-06-03 | Stop reason: HOSPADM

## 2025-06-03 RX ORDER — KETOROLAC TROMETHAMINE 30 MG/ML
INJECTION, SOLUTION INTRAMUSCULAR; INTRAVENOUS
Status: DISCONTINUED | OUTPATIENT
Start: 2025-06-03 | End: 2025-06-03

## 2025-06-03 RX ORDER — ACETAMINOPHEN 325 MG/1
650 TABLET ORAL EVERY 8 HOURS PRN
Status: DISCONTINUED | OUTPATIENT
Start: 2025-06-03 | End: 2025-06-03 | Stop reason: HOSPADM

## 2025-06-03 RX ORDER — ATORVASTATIN CALCIUM 20 MG/1
20 TABLET, FILM COATED ORAL DAILY
Status: DISCONTINUED | OUTPATIENT
Start: 2025-06-03 | End: 2025-06-03 | Stop reason: HOSPADM

## 2025-06-03 RX ORDER — HYDROMORPHONE HYDROCHLORIDE 1 MG/ML
0.5 INJECTION, SOLUTION INTRAMUSCULAR; INTRAVENOUS; SUBCUTANEOUS EVERY 5 MIN PRN
Status: DISCONTINUED | OUTPATIENT
Start: 2025-06-03 | End: 2025-06-03

## 2025-06-03 RX ORDER — OXYCODONE HYDROCHLORIDE 5 MG/1
10 TABLET ORAL EVERY 4 HOURS PRN
Status: DISCONTINUED | OUTPATIENT
Start: 2025-06-03 | End: 2025-06-03 | Stop reason: HOSPADM

## 2025-06-03 RX ADMIN — ATORVASTATIN CALCIUM 20 MG: 20 TABLET, FILM COATED ORAL at 08:06

## 2025-06-03 RX ADMIN — SODIUM CHLORIDE, POTASSIUM CHLORIDE, SODIUM LACTATE AND CALCIUM CHLORIDE: 600; 310; 30; 20 INJECTION, SOLUTION INTRAVENOUS at 10:06

## 2025-06-03 RX ADMIN — DEXAMETHASONE SODIUM PHOSPHATE 8 MG: 4 INJECTION, SOLUTION INTRA-ARTICULAR; INTRALESIONAL; INTRAMUSCULAR; INTRAVENOUS; SOFT TISSUE at 12:06

## 2025-06-03 RX ADMIN — LISINOPRIL 20 MG: 10 TABLET ORAL at 08:06

## 2025-06-03 RX ADMIN — HEPARIN SODIUM 5000 UNITS: 5000 INJECTION, SOLUTION INTRAVENOUS; SUBCUTANEOUS at 11:06

## 2025-06-03 RX ADMIN — ONDANSETRON 4 MG: 2 INJECTION INTRAMUSCULAR; INTRAVENOUS at 12:06

## 2025-06-03 RX ADMIN — SODIUM CHLORIDE, POTASSIUM CHLORIDE, SODIUM LACTATE AND CALCIUM CHLORIDE 1000 ML: 600; 310; 30; 20 INJECTION, SOLUTION INTRAVENOUS at 12:06

## 2025-06-03 RX ADMIN — HYDROCHLOROTHIAZIDE 12.5 MG: 12.5 TABLET ORAL at 08:06

## 2025-06-03 RX ADMIN — VASOPRESSIN 1 UNITS: 20 INJECTION INTRAVENOUS at 01:06

## 2025-06-03 RX ADMIN — FENTANYL CITRATE 50 MCG: 50 INJECTION INTRAMUSCULAR; INTRAVENOUS at 12:06

## 2025-06-03 RX ADMIN — SODIUM CHLORIDE, POTASSIUM CHLORIDE, SODIUM LACTATE AND CALCIUM CHLORIDE 1000 ML: 600; 310; 30; 20 INJECTION, SOLUTION INTRAVENOUS at 02:06

## 2025-06-03 RX ADMIN — KETOROLAC TROMETHAMINE 30 MG: 30 INJECTION INTRAMUSCULAR; INTRAVENOUS at 12:06

## 2025-06-03 RX ADMIN — HYDROMORPHONE HYDROCHLORIDE 0.5 MG: 1 INJECTION, SOLUTION INTRAMUSCULAR; INTRAVENOUS; SUBCUTANEOUS at 02:06

## 2025-06-03 RX ADMIN — SUGAMMADEX 200 MG: 100 INJECTION, SOLUTION INTRAVENOUS at 01:06

## 2025-06-03 RX ADMIN — PIPERACILLIN AND TAZOBACTAM 4.5 G: 4; .5 INJECTION, POWDER, LYOPHILIZED, FOR SOLUTION INTRAVENOUS; PARENTERAL at 03:06

## 2025-06-03 RX ADMIN — ROCURONIUM BROMIDE 50 MG: 10 INJECTION INTRAVENOUS at 12:06

## 2025-06-03 RX ADMIN — MIDAZOLAM HYDROCHLORIDE 2 MG: 1 INJECTION, SOLUTION INTRAMUSCULAR; INTRAVENOUS at 10:06

## 2025-06-03 RX ADMIN — LIDOCAINE HYDROCHLORIDE 100 MG: 20 INJECTION INTRAVENOUS at 12:06

## 2025-06-03 RX ADMIN — DEXMEDETOMIDINE 8 MCG: 200 INJECTION, SOLUTION INTRAVENOUS at 12:06

## 2025-06-03 RX ADMIN — PIPERACILLIN AND TAZOBACTAM 4.5 G: 4; .5 INJECTION, POWDER, LYOPHILIZED, FOR SOLUTION INTRAVENOUS; PARENTERAL at 04:06

## 2025-06-03 RX ADMIN — SODIUM CHLORIDE, POTASSIUM CHLORIDE, SODIUM LACTATE AND CALCIUM CHLORIDE: 600; 310; 30; 20 INJECTION, SOLUTION INTRAVENOUS at 12:06

## 2025-06-03 RX ADMIN — PROPOFOL 160 MG: 10 INJECTION, EMULSION INTRAVENOUS at 12:06

## 2025-06-04 LAB — POCT GLUCOSE: 192 MG/DL (ref 70–110)

## 2025-06-05 LAB
BACTERIA UR CULT: NORMAL
ESTROGEN SERPL-MCNC: NORMAL PG/ML
INSULIN SERPL-ACNC: NORMAL U[IU]/ML
LAB AP CLINICAL INFORMATION: NORMAL
LAB AP GROSS DESCRIPTION: NORMAL
LAB AP REPORT FOOTNOTES: NORMAL
POCT GLUCOSE: 136 MG/DL (ref 70–110)

## 2025-06-07 LAB
BACTERIA BLD CULT: NORMAL
BACTERIA BLD CULT: NORMAL

## 2025-06-19 ENCOUNTER — OFFICE VISIT (OUTPATIENT)
Dept: SURGERY | Facility: CLINIC | Age: 70
End: 2025-06-19
Payer: COMMERCIAL

## 2025-06-19 VITALS
SYSTOLIC BLOOD PRESSURE: 149 MMHG | OXYGEN SATURATION: 98 % | BODY MASS INDEX: 39.16 KG/M2 | RESPIRATION RATE: 18 BRPM | DIASTOLIC BLOOD PRESSURE: 88 MMHG | HEART RATE: 78 BPM | WEIGHT: 221 LBS | HEIGHT: 63 IN

## 2025-06-19 DIAGNOSIS — Z90.49 S/P APPENDECTOMY: Primary | ICD-10-CM

## 2025-06-19 PROCEDURE — 99215 OFFICE O/P EST HI 40 MIN: CPT | Mod: PBBFAC

## 2025-06-19 RX ORDER — CLINDAMYCIN HYDROCHLORIDE 300 MG/1
300 CAPSULE ORAL 3 TIMES DAILY
Qty: 15 CAPSULE | Refills: 0 | Status: SHIPPED | OUTPATIENT
Start: 2025-06-19 | End: 2025-06-24

## 2025-06-19 NOTE — LETTER
June 19, 2025      [facility]  [address] [address]  Phone: [phone]       Patient: Michelle Tidwell   YOB: 1955  Date of Visit: 06/19/2025    To Whom It May Concern:    Cassie Tidwell was at [facility] on 06/19/2025. The patient may return to work on 6/23/25 without restrictions.    She will follow up in clinic again on 07/03/25     If you have any questions or concerns, or if I can be of further assistance, please do not hesitate to contact me.    Sincerely,    Schuyler Garg MD

## 2025-06-19 NOTE — LETTER
June 19, 2025      [facility]  [address] [address]  Phone: [phone]       Patient: Michelle Tidwell   YOB: 1955  Date of Visit: 06/19/2025    To Whom It May Concern:    Cassie Tidwell was at [facility] on 06/19/2025. The patient may return to work on 6/23/25 without restrictions.    She will follow up in clinic again on 07/03/25     If you have any questions or concerns, or if I can be of further assistance, please do not hesitate to contact me.    Sincerely,    Ifeanyi Selby LPN

## 2025-06-19 NOTE — LETTER
June 19, 2025      [facility]  [address] [address]  Phone: [phone]       Patient: Michelle Tidwell   YOB: 1955  Date of Visit: 06/19/2025    To Whom It May Concern:    Cassie Tidwell was at [facility] on 06/19/2025. The patient may return to work on 6/20/25 without restrictions.    She will follow up in clinic again on 6/20/25     If you have any questions or concerns, or if I can be of further assistance, please do not hesitate to contact me.    Sincerely,    Schuyler Garg MD

## 2025-06-19 NOTE — PROGRESS NOTES
[facility] General Surgery Clinic Note    Reason for Clinic Visit: Post-op Visit s/p lap appendectomy     SUBJECTIVE    HPI:   70 y.o. female with a PMHx significant for GERD, HLD, HTN, DM2 presenting for her post-op visit after undergoing a laparoscopic appendectomy on 6/3/25. She reports some intermittent episodes of loose stools since her surgery, but has otherwise continued to recover well. Tolerating a regular diet. Notes mild tenderness and redness over her LLQ incision site but no drainage.    PMH:   Past Medical History:   Diagnosis Date    Anxiety     GERD (gastroesophageal reflux disease)     Hypercholesterolemia     Hypertension     Mild intermittent asthma, uncomplicated     Type 2 diabetes mellitus without complications        PSH:   Past Surgical History:   Procedure Laterality Date    DILATION AND CURETTAGE OF UTERUS      LAPAROSCOPIC APPENDECTOMY N/A 6/3/2025    Procedure: APPENDECTOMY, LAPAROSCOPIC;  Surgeon: Aaron Hernandez Jr., MD;  Location: [facility];  Service: General;  Laterality: N/A;    TOTAL ABDOMINAL HYSTERECTOMY  01/03/2005       FamHx:   Family History   Problem Relation Name Age of Onset    No Known Problems Mother      No Known Problems Father         SocHx:  Social History[1]    Allergies:   Review of patient's allergies indicates:   Allergen Reactions    Codeine      Other reaction(s): unknown    Metformin Diarrhea    Penicillin      Other reaction(s): unknown    Sulfamethoxazole-trimethoprim      Other reaction(s): unknown       Medications:  Medications Ordered Prior to Encounter[2]      OBJECTIVE    Physical Exam:  General: NAD  Neuro: Alert and oriented x3  Resp: Unlabored respirations on RA  Cardiac: RRR  Abdomen: Soft, ND, mild erythema and TTP at LLQ incision   Extremities: WWP, no sensation or motor deficits    Labs / Micro / Path  Appendix, appendectomy:   - Acute appendicitis.      Imaging:      A/P  70 y.o. female with a PMHx significant for GERD, HLD, HTN, DM2 presenting for  her post-op visit after undergoing a laparoscopic appendectomy on 6/3/25. Path c/w acute appendicitis. She has developed cellulitis over her LLQ incision site without evidence of any drainable fluid collections.    - Clindamycin for 5 days for LLQ port site cellulitis  - RTC in 2 weeks for repeat wound check    Schuyler Garg MD  [employer] General Surgery, PGY-3         [1]   Social History  Socioeconomic History    Marital status:     Number of children: 2   Occupational History    Occupation:    Tobacco Use    Smoking status: Never    Smokeless tobacco: Never   Substance and Sexual Activity    Alcohol use: Yes     Comment: rare    Drug use: Never    Sexual activity: Not Currently     Social Drivers of Health     Financial Resource Strain: Low Risk  (6/3/2025)    Overall Financial Resource Strain (CARDIA)     Difficulty of Paying Living Expenses: Not hard at all   Food Insecurity: No Food Insecurity (6/3/2025)    Hunger Vital Sign     Worried About Running Out of Food in the Last Year: Never true     Ran Out of Food in the Last Year: Never true   Transportation Needs: No Transportation Needs (6/3/2025)    PRAPARE - Transportation     Lack of Transportation (Medical): No     Lack of Transportation (Non-Medical): No   Stress: No Stress Concern Present (6/3/2025)    [employer] [place] [employer] - Occupational Stress Questionnaire     Feeling of Stress : Not at all   Housing Stability: Low Risk  (6/3/2025)    Housing Stability Vital Sign     Unable to Pay for Housing in the Last Year: No     Homeless in the Last Year: No   [2]   Current Outpatient Medications on File Prior to Visit   Medication Sig Dispense Refill    albuterol sulfate (PROAIR HFA INHL) 90 %.      ascorbic acid, vitamin C, (VITAMIN C) 1000 MG tablet Vitamin C Take No date recorded No form recorded No frequency recorded No route recorded No set duration recorded No set duration amount recorded active No dosage strength  recorded No dosage strength units of measure recorded      b complex vitamins tablet Take 1 tablet by mouth once daily.      ELIOT ASPIRIN ORAL Eliot Aspirin Take No date recorded No form recorded No frequency recorded No route recorded No set duration recorded No set duration amount recorded active No dosage strength recorded No dosage strength units of measure recorded      budesonide/formoterol fumarate (SYMBICORT INHL) Symbicort Take No date recorded No form recorded No frequency recorded No route recorded No set duration recorded No set duration amount recorded suspended No dosage strength recorded No dosage strength units of measure recorded      furosemide (LASIX) 20 MG tablet TAKE 1 TABLET (20 MG TOTAL) BY MOUTH DAILY AS NEEDED (SWELLING). 90 tablet 1    ketoconazole (NIZORAL) 2 % cream Apply topically once daily. 30 g 2    lisinopriL-hydrochlorothiazide (PRINZIDE,ZESTORETIC) 20-12.5 mg per tablet TAKE 1 TABLET BY MOUTH EVERY DAY 90 tablet 3    magnesium carb,citrate,oxide (MAGNESIUM COMPLEX ORAL) Take by mouth.      meloxicam (MOBIC) 15 MG tablet TAKE 1 TABLET BY MOUTH EVERY DAY 30 tablet 5    mv-mn/iron/folic acid/herb 190 (VITAMIN D3 COMPLETE ORAL) Take by mouth.      oxybutynin (DITROPAN-XL) 5 MG TR24 TAKE 1 TABLET BY MOUTH EVERY DAY 90 tablet 3    pioglitazone (ACTOS) 30 MG tablet TAKE 1 TABLET BY MOUTH EVERY DAY 90 tablet 1    prenat.vits,jose,min-iron-folic Tab Take by mouth.      rosuvastatin (CRESTOR) 5 MG tablet TAKE 1 TABLET BY MOUTH EVERY DAY 90 tablet 3    tirzepatide (MOUNJARO) 2.5 mg/0.5 mL PnIj Inject 2.5 mg into the skin every 7 days. 2 mL 0    zinc sulfate (ZINC-220 ORAL) zinc Take No date recorded No form recorded No frequency recorded No route recorded No set duration recorded No set duration amount recorded active No dosage strength recorded No dosage strength units of measure recorded      gabapentin (NEURONTIN) 300 MG capsule Take 1 capsule (300 mg total) by mouth 3 (three) times daily.  for 14 days 42 capsule 0    HYDROcodone-acetaminophen (NORCO) 5-325 mg per tablet Take 1 tablet by mouth every 6 (six) hours as needed for Pain. (Patient not taking: Reported on 6/19/2025) 10 tablet 0    pantoprazole (PROTONIX) 40 MG tablet TAKE 1 TABLET BY MOUTH EVERY DAY (Patient not taking: Reported on 6/19/2025) 90 tablet 1     No current facility-administered medications on file prior to visit.

## 2025-06-20 NOTE — PROGRESS NOTES
I have reviewed the notes, assessments, and/or procedures performed by Dr Garg, I concur with her/his documentation of Michelle Tidwell.  Date of Service: 6/19/2025    [facility]

## 2025-07-03 ENCOUNTER — OFFICE VISIT (OUTPATIENT)
Dept: SURGERY | Facility: CLINIC | Age: 70
End: 2025-07-03
Payer: COMMERCIAL

## 2025-07-03 VITALS
HEIGHT: 63 IN | OXYGEN SATURATION: 96 % | SYSTOLIC BLOOD PRESSURE: 147 MMHG | DIASTOLIC BLOOD PRESSURE: 73 MMHG | WEIGHT: 225 LBS | HEART RATE: 70 BPM | BODY MASS INDEX: 39.87 KG/M2 | RESPIRATION RATE: 20 BRPM | TEMPERATURE: 98 F

## 2025-07-03 DIAGNOSIS — Z90.49 S/P APPENDECTOMY: Primary | ICD-10-CM

## 2025-07-03 PROCEDURE — 99213 OFFICE O/P EST LOW 20 MIN: CPT | Mod: PBBFAC

## 2025-07-03 RX ORDER — METHOCARBAMOL 500 MG/1
TABLET, FILM COATED ORAL 4 TIMES DAILY
COMMUNITY

## 2025-07-03 RX ORDER — CLINDAMYCIN HYDROCHLORIDE 300 MG/1
300 CAPSULE ORAL 3 TIMES DAILY
COMMUNITY

## 2025-07-03 NOTE — PROGRESS NOTES
[facility] Note    Reason for Clinic Visit: Post-op Visit s/p lap appendectomy     SUBJECTIVE    HPI:   70 y.o. female with a PMHx significant for GERD, HLD, HTN, DM2 presenting for her post-op visit after undergoing a laparoscopic appendectomy on 6/3/25. Had some cellulitis and dehiscence at her LLQ incision site at her 2 week post-op visit and was prescribed clindamycin. Today, she reports the wound in the left lower quadrant incision is improving. Still a small area open but overall better. Denies any fevers or chills. No drainage from the area.    PMH:   Past Medical History:   Diagnosis Date    Anxiety     GERD (gastroesophageal reflux disease)     Hypercholesterolemia     Hypertension     Mild intermittent asthma, uncomplicated     Type 2 diabetes mellitus without complications        PSH:   Past Surgical History:   Procedure Laterality Date    DILATION AND CURETTAGE OF UTERUS      LAPAROSCOPIC APPENDECTOMY N/A 6/3/2025    Procedure: APPENDECTOMY, LAPAROSCOPIC;  Surgeon: Aaron Hernandez Jr., MD;  Location: [facility];  Service: General;  Laterality: N/A;    TOTAL ABDOMINAL HYSTERECTOMY  01/03/2005       FamHx:   Family History   Problem Relation Name Age of Onset    No Known Problems Mother      No Known Problems Father         SocHx:  Social History[1]    Allergies:   Review of patient's allergies indicates:   Allergen Reactions    Codeine      Other reaction(s): unknown    Metformin Diarrhea    Penicillin      Other reaction(s): unknown    Sulfamethoxazole-trimethoprim      Other reaction(s): unknown       Medications:  Medications Ordered Prior to Encounter[2]      OBJECTIVE    Physical Exam:  General: NAD  Neuro: Alert and oriented x3  Resp: Unlabored respirations on RA  Cardiac: RRR  Abdomen: Soft, ND, LLQ incision with superficial skin dehiscence, minimal erythema, no drainage  Extremities: WWP, no sensation or motor deficits    Labs / Micro / Path  Appendix, appendectomy:   - Acute  appendicitis.      Imaging:      A/P  70 y.o. female with a PMHx significant for GERD, HLD, HTN, DM2 presenting for her post-op visit after undergoing a laparoscopic appendectomy on 6/3/25. Path c/w acute appendicitis. She has developed superficial skin dehiscence at her LLQ incision site. Improving but still has a small wound.     - RTC in 2 weeks for repeat wound check    Sandro Bryant MD  [employer] General Surgery, PGY-3           [1]   Social History  Socioeconomic History    Marital status:     Number of children: 2   Occupational History    Occupation: Bank Teller   Tobacco Use    Smoking status: Never    Smokeless tobacco: Never   Substance and Sexual Activity    Alcohol use: Yes     Comment: rare    Drug use: Never    Sexual activity: Not Currently     Social Drivers of Health     Financial Resource Strain: Low Risk  (6/3/2025)    Overall Financial Resource Strain (CARDIA)     Difficulty of Paying Living Expenses: Not hard at all   Food Insecurity: No Food Insecurity (6/3/2025)    Hunger Vital Sign     Worried About Running Out of Food in the Last Year: Never true     Ran Out of Food in the Last Year: Never true   Transportation Needs: No Transportation Needs (6/3/2025)    PRAPARE - Transportation     Lack of Transportation (Medical): No     Lack of Transportation (Non-Medical): No   Stress: No Stress Concern Present (6/3/2025)    [place] [employer] - Occupational Stress Questionnaire     Feeling of Stress : Not at all   Housing Stability: Low Risk  (6/3/2025)    Housing Stability Vital Sign     Unable to Pay for Housing in the Last Year: No     Homeless in the Last Year: No   [2]   Current Outpatient Medications on File Prior to Visit   Medication Sig Dispense Refill    albuterol sulfate (PROAIR HFA INHL) 90 %.      ascorbic acid, vitamin C, (VITAMIN C) 1000 MG tablet Vitamin C Take No date recorded No form recorded No frequency recorded No route recorded No set duration recorded No set  duration amount recorded active No dosage strength recorded No dosage strength units of measure recorded      b complex vitamins tablet Take 1 tablet by mouth once daily.      ELIOT ASPIRIN ORAL Eliot Aspirin Take No date recorded No form recorded No frequency recorded No route recorded No set duration recorded No set duration amount recorded active No dosage strength recorded No dosage strength units of measure recorded      budesonide/formoterol fumarate (SYMBICORT INHL) Symbicort Take No date recorded No form recorded No frequency recorded No route recorded No set duration recorded No set duration amount recorded suspended No dosage strength recorded No dosage strength units of measure recorded      furosemide (LASIX) 20 MG tablet TAKE 1 TABLET (20 MG TOTAL) BY MOUTH DAILY AS NEEDED (SWELLING). 90 tablet 1    gabapentin (NEURONTIN) 300 MG capsule Take 1 capsule (300 mg total) by mouth 3 (three) times daily. for 14 days 42 capsule 0    HYDROcodone-acetaminophen (NORCO) 5-325 mg per tablet Take 1 tablet by mouth every 6 (six) hours as needed for Pain. (Patient not taking: Reported on 6/19/2025) 10 tablet 0    ketoconazole (NIZORAL) 2 % cream Apply topically once daily. 30 g 2    lisinopriL-hydrochlorothiazide (PRINZIDE,ZESTORETIC) 20-12.5 mg per tablet TAKE 1 TABLET BY MOUTH EVERY DAY 90 tablet 3    magnesium carb,citrate,oxide (MAGNESIUM COMPLEX ORAL) Take by mouth.      meloxicam (MOBIC) 15 MG tablet TAKE 1 TABLET BY MOUTH EVERY DAY 30 tablet 5    mv-mn/iron/folic acid/herb 190 (VITAMIN D3 COMPLETE ORAL) Take by mouth.      oxybutynin (DITROPAN-XL) 5 MG TR24 TAKE 1 TABLET BY MOUTH EVERY DAY 90 tablet 3    pantoprazole (PROTONIX) 40 MG tablet TAKE 1 TABLET BY MOUTH EVERY DAY (Patient not taking: Reported on 6/19/2025) 90 tablet 1    pioglitazone (ACTOS) 30 MG tablet TAKE 1 TABLET BY MOUTH EVERY DAY 90 tablet 1    prenat.vits,jose,min-iron-folic Tab Take by mouth.      rosuvastatin (CRESTOR) 5 MG tablet TAKE 1  TABLET BY MOUTH EVERY DAY 90 tablet 3    tirzepatide (MOUNJARO) 2.5 mg/0.5 mL PnIj Inject 2.5 mg into the skin every 7 days. 2 mL 0    zinc sulfate (ZINC-220 ORAL) zinc Take No date recorded No form recorded No frequency recorded No route recorded No set duration recorded No set duration amount recorded active No dosage strength recorded No dosage strength units of measure recorded       No current facility-administered medications on file prior to visit.

## 2025-07-17 ENCOUNTER — OFFICE VISIT (OUTPATIENT)
Dept: SURGERY | Facility: CLINIC | Age: 70
End: 2025-07-17
Payer: COMMERCIAL

## 2025-07-17 VITALS
BODY MASS INDEX: 39.9 KG/M2 | DIASTOLIC BLOOD PRESSURE: 75 MMHG | HEIGHT: 63 IN | HEART RATE: 63 BPM | WEIGHT: 225.19 LBS | TEMPERATURE: 98 F | SYSTOLIC BLOOD PRESSURE: 147 MMHG | RESPIRATION RATE: 20 BRPM | OXYGEN SATURATION: 98 %

## 2025-07-17 DIAGNOSIS — Z90.49 S/P APPENDECTOMY: Primary | ICD-10-CM

## 2025-07-17 PROCEDURE — 99215 OFFICE O/P EST HI 40 MIN: CPT | Mod: PBBFAC

## 2025-07-17 NOTE — PROGRESS NOTES
I have reviewed the notes, assessments, and/or procedures performed by the resident, I concur with her/his documentation of Michelle Tidwell.     Margarita Naidu MD

## 2025-07-17 NOTE — PROGRESS NOTES
[facility] General Surgery Clinic Note    HPI: Michelle Tidwell is a 70 y.o. female with a PMHx of GERD, HLD, HTN, DM2, acute appendicitis now s/p laparoscopic appendectomy on 6/3/25 here today for post-op visit and wound check of LLQ incision site. Since the last visit, she has continued to wash the wound with soap and water. Denies any pain to the wound or surrounding area. Patient was using adhesive bandages over the last few days and caused some mild skin breakdown around the wound. Denies recent fever, chills, fatigue. Denies drainage/bleeding over wound site.     PMH:   Past Medical History:   Diagnosis Date    Anxiety     GERD (gastroesophageal reflux disease)     Hypercholesterolemia     Hypertension     Mild intermittent asthma, uncomplicated     Type 2 diabetes mellitus without complications       Meds: Current Medications[1]  Allergies:   Review of patient's allergies indicates:   Allergen Reactions    Codeine      Other reaction(s): unknown    Metformin Diarrhea    Penicillin      Other reaction(s): unknown    Sulfamethoxazole-trimethoprim      Other reaction(s): unknown     Social History: Social History[2]  Family History:   Family History   Problem Relation Name Age of Onset    No Known Problems Mother      No Known Problems Father       Surgical History:   Past Surgical History:   Procedure Laterality Date    DILATION AND CURETTAGE OF UTERUS      LAPAROSCOPIC APPENDECTOMY N/A 6/3/2025    Procedure: APPENDECTOMY, LAPAROSCOPIC;  Surgeon: Aaron Hernandez Jr., MD;  Location: [facility];  Service: General;  Laterality: N/A;    TOTAL ABDOMINAL HYSTERECTOMY  01/03/2005     Review of Systems:  General: No fever, chills, weight loss  Skin: No rashes or itching.  Head: Denies headache or recent trauma.  Eyes: Denies eye pain or double vision.  Neck: Denies swelling or hoarseness of voice.  Respiratory: Denies shortness of breath or chest pain  Cardiac: Denies palpitations or swelling in hands/feet.  Gastrointestinal:  Denies nausea, denies vomiting.  Urinary: Denies dysuria or hematuria.  Vascular: Denies claudication or leg swelling.  Neuro: Denies motor deficits. Denies weakness.      Objective:    Vitals:  There were no vitals filed for this visit.     Physical Exam:  Gen: NAD, afebrile,   Neuro: awake, alert, answering questions appropriately  CV: RRR   Resp: non-labored breathing, equal chest rise bilaterally  Abd: soft, ND, NT, no masses palpated  : deferred   Ext: moves all 4 spontaneously and purposefully  Skin: warm, well perfused  Wound LLQ: no evidence of bleeding, minimal fibrinous fluid over wound noted, granulation tissue visualized, no erythema/crepitus/necrosis    Pertinent Labs:  NA    Imaging:      Micro/Path/Other:  NA    Assessment/Plan:  Michelle Tidwell is a 70 y.o. female with a PMHx of GERD, HLD, HTN, DM2, acute appendicitis now s/p laparoscopic appendectomy on 6/3/25 here today for post-op visit and wound check.      - Continue to clean wound with soap and water; Wound healing appropriately   - Follow up as needed  - Patient educated on reasons to seek immediate medical attention    Konstantin Miramontes MD  [employer] General Surgery PGY1  07/17/2025 11:40 AM            [1]   Current Outpatient Medications:     albuterol sulfate (PROAIR HFA INHL), 90 %., Disp: , Rfl:     ascorbic acid, vitamin C, (VITAMIN C) 1000 MG tablet, Vitamin C Take No date recorded No form recorded No frequency recorded No route recorded No set duration recorded No set duration amount recorded active No dosage strength recorded No dosage strength units of measure recorded, Disp: , Rfl:     b complex vitamins tablet, Take 1 tablet by mouth once daily., Disp: , Rfl:     ELIOT ASPIRIN ORAL, Eliot Aspirin Take No date recorded No form recorded No frequency recorded No route recorded No set duration recorded No set duration amount recorded active No dosage strength recorded No dosage strength units of measure recorded, Disp: , Rfl:      budesonide/formoterol fumarate (SYMBICORT INHL), Symbicort Take No date recorded No form recorded No frequency recorded No route recorded No set duration recorded No set duration amount recorded suspended No dosage strength recorded No dosage strength units of measure recorded, Disp: , Rfl:     busPIRone 7.5 mg Cap, Take by mouth., Disp: , Rfl:     clindamycin (CLEOCIN) 300 MG capsule, Take 300 mg by mouth 3 (three) times daily., Disp: , Rfl:     furosemide (LASIX) 20 MG tablet, TAKE 1 TABLET (20 MG TOTAL) BY MOUTH DAILY AS NEEDED (SWELLING)., Disp: 90 tablet, Rfl: 1    ketoconazole (NIZORAL) 2 % cream, Apply topically once daily., Disp: 30 g, Rfl: 2    lisinopriL-hydrochlorothiazide (PRINZIDE,ZESTORETIC) 20-12.5 mg per tablet, TAKE 1 TABLET BY MOUTH EVERY DAY, Disp: 90 tablet, Rfl: 3    magnesium carb,citrate,oxide (MAGNESIUM COMPLEX ORAL), Take by mouth., Disp: , Rfl:     meloxicam (MOBIC) 15 MG tablet, TAKE 1 TABLET BY MOUTH EVERY DAY, Disp: 30 tablet, Rfl: 5    methocarbamoL (ROBAXIN) 500 MG Tab, Take by mouth 4 (four) times daily., Disp: , Rfl:     mv-mn/iron/folic acid/herb 190 (VITAMIN D3 COMPLETE ORAL), Take by mouth., Disp: , Rfl:     oxybutynin (DITROPAN-XL) 5 MG TR24, TAKE 1 TABLET BY MOUTH EVERY DAY, Disp: 90 tablet, Rfl: 3    pioglitazone (ACTOS) 30 MG tablet, TAKE 1 TABLET BY MOUTH EVERY DAY, Disp: 90 tablet, Rfl: 1    prenat.vits,jose,min-iron-folic Tab, Take by mouth., Disp: , Rfl:     rosuvastatin (CRESTOR) 5 MG tablet, TAKE 1 TABLET BY MOUTH EVERY DAY, Disp: 90 tablet, Rfl: 3    tirzepatide (MOUNJARO) 2.5 mg/0.5 mL PnIj, Inject 2.5 mg into the skin every 7 days., Disp: 2 mL, Rfl: 0    zinc sulfate (ZINC-220 ORAL), zinc Take No date recorded No form recorded No frequency recorded No route recorded No set duration recorded No set duration amount recorded active No dosage strength recorded No dosage strength units of measure recorded, Disp: , Rfl:     gabapentin (NEURONTIN) 300 MG capsule, Take 1  capsule (300 mg total) by mouth 3 (three) times daily. for 14 days, Disp: 42 capsule, Rfl: 0    HYDROcodone-acetaminophen (NORCO) 5-325 mg per tablet, Take 1 tablet by mouth every 6 (six) hours as needed for Pain. (Patient not taking: Reported on 6/19/2025), Disp: 10 tablet, Rfl: 0    pantoprazole (PROTONIX) 40 MG tablet, TAKE 1 TABLET BY MOUTH EVERY DAY (Patient not taking: Reported on 7/17/2025), Disp: 90 tablet, Rfl: 1  [2]   Social History  Tobacco Use    Smoking status: Never    Smokeless tobacco: Never   Substance Use Topics    Alcohol use: Yes     Comment: rare    Drug use: Never

## 2025-07-17 NOTE — PROGRESS NOTES
[facility] Clinic Note    HPI: 70 y.o. female with a PMHx significant for GERD, HLD, HTN, DM2 presents for a repeat wound check after undergoing a laparoscopic appendectomy on 6/3/25. Had some cellulitis and dehiscence at her LLQ incision site at her 2 week post-op visit and was prescribed clindamycin. Still a small area open but overall better. She reports scabbing and peeling with less redness in the area. Yellow serous fluid left on dressing. Denies any fevers or chills.     PMH:   Past Medical History:   Diagnosis Date    Anxiety     GERD (gastroesophageal reflux disease)     Hypercholesterolemia     Hypertension     Mild intermittent asthma, uncomplicated     Type 2 diabetes mellitus without complications       Meds: Current Medications[1]  Allergies:   Review of patient's allergies indicates:   Allergen Reactions    Codeine      Other reaction(s): unknown    Metformin Diarrhea    Penicillin      Other reaction(s): unknown    Sulfamethoxazole-trimethoprim      Other reaction(s): unknown     Social History: Social History[2]  Family History:   Family History   Problem Relation Name Age of Onset    No Known Problems Mother      No Known Problems Father       Surgical History:   Past Surgical History:   Procedure Laterality Date    DILATION AND CURETTAGE OF UTERUS      LAPAROSCOPIC APPENDECTOMY N/A 6/3/2025    Procedure: APPENDECTOMY, LAPAROSCOPIC;  Surgeon: Aaron Hernandez Jr., MD;  Location: [facility];  Service: General;  Laterality: N/A;    TOTAL ABDOMINAL HYSTERECTOMY  01/03/2005     Review of Systems:  Skin: Ulcers near LLQ incision site from bandage irritation. LLQ incision with superficial skin dehiscence, no erythema, no drainage.  Head: Denies headache or recent trauma.  Eyes: Denies eye pain or double vision.  Neck: Denies swelling or hoarseness of voice.  Respiratory: Denies shortness of breath or chest pain  Cardiac: Denies palpitations or swelling in hands/feet.  Gastrointestinal: Denies  nausea, denies vomiting.  Urinary: Denies dysuria or hematuria.  Vascular: Denies claudication or leg swelling.  Neuro: Denies motor deficits. Denies weakness.  Endocrine: Denies excessive sweating or cold intolerance.  Psych: Denies memory problems. Denies anxiety.    Objective:    Vitals:  Vitals:    07/17/25 1139   BP: (!) 147/75   Pulse: 63   Resp: 20   Temp: 97.9 °F (36.6 °C)        Physical Exam:  Gen: NAD  Neuro: awake, alert, answering questions appropriately  CV: RRR  Resp: non-labored breathing, PATRICK  Abd: soft, ND, NT, LLQ incision with superficial skin dehiscence, no erythema, no drainage with surrounding ulcers from bandage irritation    Ext: moves all 4 spontaneously and purposefully  Skin: warm, well perfused    Pertinent Labs:  N/A    Imaging:  N/A    Micro/Path/Other:  N/A    Assessment/Plan:  70 y.o. female with a PMHx significant for GERD, HLD, HTN, DM2 presenting for her post-op visit after undergoing a laparoscopic appendectomy on 6/3/25. Path c/w acute appendicitis. She has developed superficial skin dehiscence at her LLQ incision site. Improving but still has a small wound.      - Clean with soap and water, no dressing needed.    Kiel Stout, MS3  [facility] General Surgery  07/17/2025 12:01 PM           [1]   Current Outpatient Medications:     albuterol sulfate (PROAIR HFA INHL), 90 %., Disp: , Rfl:     ascorbic acid, vitamin C, (VITAMIN C) 1000 MG tablet, Vitamin C Take No date recorded No form recorded No frequency recorded No route recorded No set duration recorded No set duration amount recorded active No dosage strength recorded No dosage strength units of measure recorded, Disp: , Rfl:     b complex vitamins tablet, Take 1 tablet by mouth once daily., Disp: , Rfl:     ELIOT ASPIRIN ORAL, Eliot Aspirin Take No date recorded No form recorded No frequency recorded No route recorded No set duration recorded No set duration amount recorded active No dosage strength recorded No dosage  strength units of measure recorded, Disp: , Rfl:     budesonide/formoterol fumarate (SYMBICORT INHL), Symbicort Take No date recorded No form recorded No frequency recorded No route recorded No set duration recorded No set duration amount recorded suspended No dosage strength recorded No dosage strength units of measure recorded, Disp: , Rfl:     busPIRone 7.5 mg Cap, Take by mouth., Disp: , Rfl:     clindamycin (CLEOCIN) 300 MG capsule, Take 300 mg by mouth 3 (three) times daily., Disp: , Rfl:     furosemide (LASIX) 20 MG tablet, TAKE 1 TABLET (20 MG TOTAL) BY MOUTH DAILY AS NEEDED (SWELLING)., Disp: 90 tablet, Rfl: 1    ketoconazole (NIZORAL) 2 % cream, Apply topically once daily., Disp: 30 g, Rfl: 2    lisinopriL-hydrochlorothiazide (PRINZIDE,ZESTORETIC) 20-12.5 mg per tablet, TAKE 1 TABLET BY MOUTH EVERY DAY, Disp: 90 tablet, Rfl: 3    magnesium carb,citrate,oxide (MAGNESIUM COMPLEX ORAL), Take by mouth., Disp: , Rfl:     meloxicam (MOBIC) 15 MG tablet, TAKE 1 TABLET BY MOUTH EVERY DAY, Disp: 30 tablet, Rfl: 5    methocarbamoL (ROBAXIN) 500 MG Tab, Take by mouth 4 (four) times daily., Disp: , Rfl:     mv-mn/iron/folic acid/herb 190 (VITAMIN D3 COMPLETE ORAL), Take by mouth., Disp: , Rfl:     oxybutynin (DITROPAN-XL) 5 MG TR24, TAKE 1 TABLET BY MOUTH EVERY DAY, Disp: 90 tablet, Rfl: 3    pioglitazone (ACTOS) 30 MG tablet, TAKE 1 TABLET BY MOUTH EVERY DAY, Disp: 90 tablet, Rfl: 1    prenat.vits,jose,min-iron-folic Tab, Take by mouth., Disp: , Rfl:     rosuvastatin (CRESTOR) 5 MG tablet, TAKE 1 TABLET BY MOUTH EVERY DAY, Disp: 90 tablet, Rfl: 3    tirzepatide (MOUNJARO) 2.5 mg/0.5 mL PnIj, Inject 2.5 mg into the skin every 7 days., Disp: 2 mL, Rfl: 0    zinc sulfate (ZINC-220 ORAL), zinc Take No date recorded No form recorded No frequency recorded No route recorded No set duration recorded No set duration amount recorded active No dosage strength recorded No dosage strength units of measure recorded, Disp: , Rfl:      gabapentin (NEURONTIN) 300 MG capsule, Take 1 capsule (300 mg total) by mouth 3 (three) times daily. for 14 days, Disp: 42 capsule, Rfl: 0    HYDROcodone-acetaminophen (NORCO) 5-325 mg per tablet, Take 1 tablet by mouth every 6 (six) hours as needed for Pain. (Patient not taking: Reported on 6/19/2025), Disp: 10 tablet, Rfl: 0    pantoprazole (PROTONIX) 40 MG tablet, TAKE 1 TABLET BY MOUTH EVERY DAY (Patient not taking: Reported on 7/17/2025), Disp: 90 tablet, Rfl: 1  [2]   Social History  Tobacco Use    Smoking status: Never    Smokeless tobacco: Never   Substance Use Topics    Alcohol use: Yes     Comment: rare    Drug use: Never

## (undated) DEVICE — SUT MCRYL PLUS 4-0 PS2 27IN

## (undated) DEVICE — SYR 10CC LUER LOCK

## (undated) DEVICE — GLOVE SIGNATURE ESSNTL LTX 7

## (undated) DEVICE — BAG TISS RETRV MONARCH 10MM

## (undated) DEVICE — KIT SURGICAL TURNOVER

## (undated) DEVICE — CART STAPLE FLEX ETX 3.5MM BLU

## (undated) DEVICE — SCISSOR 5MMX35CM DIRECT DRIVE

## (undated) DEVICE — GLOVE SIGNATURE ESSNTL LTX 6.5

## (undated) DEVICE — R TROCAR ENDOPTH EXCL DILATING

## (undated) DEVICE — GLOVE PROTEXIS PF LATEX 7.0

## (undated) DEVICE — ADHESIVE DERMABOND ADVANCED

## (undated) DEVICE — SOL IRRI STRL WATER 1000ML

## (undated) DEVICE — NDL HYPO REG 25G X 1 1/2

## (undated) DEVICE — KIT LAPAROSCOPY UNIVERSITY

## (undated) DEVICE — SUT 2/0 30IN SILK BLK BRAI

## (undated) DEVICE — STAPLER INT LINEAR ARTC 3.5-45

## (undated) DEVICE — CLIPPER BLADE MOD 4406 (CAREF)

## (undated) DEVICE — SUT VICRYL+ 27 UR-6 VIOL

## (undated) DEVICE — TROCAR ENDOPATH XCEL 12X100MM

## (undated) DEVICE — CART STAPLE RELD 45MM WHT

## (undated) DEVICE — TROCAR ENDOPATH ECEL

## (undated) DEVICE — GLOVE SENSICARE PI GRN 7.5

## (undated) DEVICE — ELECTRODE PATIENT RETURN DISP

## (undated) DEVICE — GLOVE SENSICARE NEOPRENE 7

## (undated) DEVICE — NDL GRANEE OPEN LOOP GRASPER

## (undated) DEVICE — TROCAR ENDOPATH XCELL 150X12MM

## (undated) DEVICE — APPLICATOR CHLORAPREP ORN 26ML

## (undated) DEVICE — SUT VICRYL 3-0 27 SH